# Patient Record
Sex: FEMALE | Race: WHITE
[De-identification: names, ages, dates, MRNs, and addresses within clinical notes are randomized per-mention and may not be internally consistent; named-entity substitution may affect disease eponyms.]

---

## 2017-04-18 ENCOUNTER — HOSPITAL ENCOUNTER (OUTPATIENT)
Dept: HOSPITAL 45 - C.LAB | Age: 62
Discharge: HOME | End: 2017-04-18
Attending: INTERNAL MEDICINE
Payer: COMMERCIAL

## 2017-04-18 DIAGNOSIS — E55.9: ICD-10-CM

## 2017-04-18 DIAGNOSIS — Z11.59: ICD-10-CM

## 2017-04-18 DIAGNOSIS — I10: Primary | ICD-10-CM

## 2017-04-18 LAB
APPEARANCE UR: CLEAR
BILIRUB UR-MCNC: (no result) MG/DL
COLOR UR: YELLOW
MANUAL MICROSCOPIC REQUIRED?: NO
NITRITE UR QL STRIP: (no result)
PH UR STRIP: 6 [PH] (ref 4.5–7.5)
REVIEW REQ?: NO
SP GR UR STRIP: 1.01 (ref 1–1.03)
URINE EPITHELIAL CELL AUTO: (no result) /LPF (ref 0–5)
UROBILINOGEN UR-MCNC: (no result) MG/DL
ZZUR CULT IF INDIC CLEAN CATCH: NO

## 2017-06-09 ENCOUNTER — HOSPITAL ENCOUNTER (OUTPATIENT)
Dept: HOSPITAL 45 - C.MAMM | Age: 62
Discharge: HOME | End: 2017-06-09
Attending: OBSTETRICS & GYNECOLOGY
Payer: COMMERCIAL

## 2017-06-09 DIAGNOSIS — Z12.31: Primary | ICD-10-CM

## 2017-06-09 NOTE — MAMMOGRAPHY REPORT
BILATERAL DIGITAL SCREENING MAMMOGRAM WITH CAD: 6/9/2017

CLINICAL HISTORY: Routine screening.  





TECHNIQUE:  Current study was also evaluated with a Computer Aided Detection (CAD) system.  Bilateral
 CC and MLO views were obtained.



COMPARISON: Comparison is made to exams dated:  9/8/2015 mammogram, 11/19/2013 mammogram, 2/16/2011 m
ammogram, 9/28/2005 mammogram, and 12/18/2003 mammogram - [facility].   



BREAST COMPOSITION:  There are scattered areas of fibroglandular density in both breasts.  



FINDINGS:  No suspicious masses, calcifications, or areas of architectural distortion are noted in ei
ther breast. There has been no significant interval change compared to prior exams.  Bilateral benign
-appearing calcifications are not significantly changed.  Small benign appearing mass in the left 12:
00 breast is stable compared to prior exams.





IMPRESSION:  ACR BI-RADS CATEGORY 2: BENIGN

There is no mammographic evidence of malignancy. A 1 year screening mammogram is recommended.  The pa
tient will receive written notification of the results.  





Approximately 10% of breast cancers are not detected with mammography. A negative mammographic report
 should not delay biopsy if a clinically suggestive mass is present.



Anel Triana M.D.          

ah/:6/9/2017 11:02:09  



Imaging Technologist: Odilon KING(R)(M), [facility]

letter sent: Normal 1/2  

BI-RADS Code: ACR BI-RADS Category 2: Benign

## 2017-06-15 ENCOUNTER — HOSPITAL ENCOUNTER (OUTPATIENT)
Dept: HOSPITAL 45 - C.MRIBC | Age: 62
Discharge: HOME | End: 2017-06-15
Attending: PHYSICIAN ASSISTANT
Payer: COMMERCIAL

## 2017-06-15 DIAGNOSIS — I77.1: ICD-10-CM

## 2017-06-15 DIAGNOSIS — I70.1: ICD-10-CM

## 2017-06-15 DIAGNOSIS — I65.23: Primary | ICD-10-CM

## 2017-06-15 NOTE — DIAGNOSTIC IMAGING REPORT
MRA ABDOMEN COMBO



CLINICAL HISTORY: I65.23,I70.1,I77.1 renal artery stenosis.



COMPARISON STUDY:  No previous studies for comparison.



FINDINGS: Imaging was performed before and after the administration of 8 cc of

intravenous Gadavist. Angiographic sequence was obtained. MIP imaging was

performed.



There is a 19 mm upper pole left renal cyst.



The right kidney is atrophic measuring 16 mm in length. The left kidney measures

106 mm in length. There is diminished right renal enhancement consistent with

renal artery stenosis.



There are advanced atheromatous changes present within the abdominal aorta.

There is a 75% stenosis involving the proximal right common iliac artery.



The right renal artery is not visualized, and is likely severely stenotic or

occluded.



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

with distal reconstitution.



There are atheromatous changes present within the main left renal artery, but

there is no evidence of hemodynamic significant stenosis. There is a suspected

stenosis of accessory lower pole left renal artery branch. The degree of

stenosis is difficult to quantitate.



IMPRESSION:  

1. Severe atheromatous changes within the abdominal aorta with multifocal areas

of irregular narrowing and plaque formation

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

3. Nonvisualization the right renal artery consistent with a severely stenotic

or occluded vessel. The right kidney is atrophic with diminished enhancement

4. Atheromatous changes within the main left renal artery but no evidence of

hemodynamic significant stenosis. Suspected stenosis of and accessory lower pole

left renal artery branch

5. 75% stenosis of the right common iliac artery 









Electronically signed by:  Chintan Luis M.D.

6/15/2017 9:54 AM



Dictated Date/Time:  6/15/2017 9:40 AM

## 2017-06-26 ENCOUNTER — HOSPITAL ENCOUNTER (OUTPATIENT)
Dept: HOSPITAL 45 - C.LABBFT | Age: 62
Discharge: HOME | End: 2017-06-26
Attending: NURSE PRACTITIONER
Payer: COMMERCIAL

## 2017-06-26 DIAGNOSIS — I70.1: ICD-10-CM

## 2017-06-26 DIAGNOSIS — I10: Primary | ICD-10-CM

## 2017-06-26 LAB
ANION GAP SERPL CALC-SCNC: 11 MMOL/L (ref 3–11)
APPEARANCE UR: CLEAR
BILIRUB UR-MCNC: (no result) MG/DL
BUN SERPL-MCNC: 13 MG/DL (ref 7–18)
BUN/CREAT SERPL: 10.8 (ref 10–20)
CALCIUM SERPL-MCNC: 9.1 MG/DL (ref 8.5–10.1)
CHLORIDE SERPL-SCNC: 110 MMOL/L (ref 98–107)
CO2 SERPL-SCNC: 18 MMOL/L (ref 21–32)
COLOR UR: YELLOW
CREAT SERPL-MCNC: 1.2 MG/DL (ref 0.6–1.2)
CREAT UR-MCNC: 20 MG/DL
GLUCOSE SERPL-MCNC: 102 MG/DL (ref 70–99)
MANUAL MICROSCOPIC REQUIRED?: NO
NITRITE UR QL STRIP: (no result)
PH UR STRIP: 7 [PH] (ref 4.5–7.5)
PHOSPHATE SERPL-MCNC: 3.8 MG/DL (ref 2.5–4.9)
POTASSIUM SERPL-SCNC: 3.7 MMOL/L (ref 3.5–5.1)
PROT UR STRIP-MCNC: 32.3 MG/DL (ref 0–11.9)
REVIEW REQ?: NO
SODIUM SERPL-SCNC: 139 MMOL/L (ref 136–145)
SP GR UR STRIP: 1.01 (ref 1–1.03)
URINE EPITHELIAL CELL AUTO: (no result) /LPF (ref 0–5)
URINE PROTIEN/CREAT RATIO: 1.6 (ref 0–0.2)
UROBILINOGEN UR-MCNC: (no result) MG/DL

## 2017-07-08 ENCOUNTER — HOSPITAL ENCOUNTER (OUTPATIENT)
Dept: HOSPITAL 45 - C.LABBC | Age: 62
Discharge: HOME | End: 2017-07-08
Attending: INTERNAL MEDICINE
Payer: COMMERCIAL

## 2017-07-08 DIAGNOSIS — I10: Primary | ICD-10-CM

## 2017-07-08 LAB
ANION GAP SERPL CALC-SCNC: 9 MMOL/L (ref 3–11)
APPEARANCE UR: CLEAR
BILIRUB UR-MCNC: (no result) MG/DL
BUN SERPL-MCNC: 11 MG/DL (ref 7–18)
BUN/CREAT SERPL: 7.6 (ref 10–20)
CALCIUM SERPL-MCNC: 9.3 MG/DL (ref 8.5–10.1)
CHLORIDE SERPL-SCNC: 90 MMOL/L (ref 98–107)
CO2 SERPL-SCNC: 24 MMOL/L (ref 21–32)
COLOR UR: YELLOW
CREAT SERPL-MCNC: 1.4 MG/DL (ref 0.6–1.2)
CREAT UR-MCNC: 19 MG/DL
GLUCOSE SERPL-MCNC: 99 MG/DL (ref 70–99)
MANUAL MICROSCOPIC REQUIRED?: NO
NITRITE UR QL STRIP: (no result)
PH UR STRIP: 7 [PH] (ref 4.5–7.5)
PHOSPHATE SERPL-MCNC: 3.1 MG/DL (ref 2.5–4.9)
POTASSIUM SERPL-SCNC: 3.5 MMOL/L (ref 3.5–5.1)
PROT UR STRIP-MCNC: 12.1 MG/DL (ref 0–11.9)
REVIEW REQ?: NO
SODIUM SERPL-SCNC: 123 MMOL/L (ref 136–145)
SP GR UR STRIP: 1.01 (ref 1–1.03)
TSH SERPL-ACNC: 1.62 UIU/ML (ref 0.3–4.5)
URINE BILL WITH OR WITHOUT MIC: (no result)
URINE PROTIEN/CREAT RATIO: 0.6 (ref 0–0.2)
UROBILINOGEN UR-MCNC: (no result) MG/DL

## 2017-07-10 ENCOUNTER — HOSPITAL ENCOUNTER (OUTPATIENT)
Dept: HOSPITAL 45 - C.LABBFT | Age: 62
Discharge: HOME | End: 2017-07-10
Attending: INTERNAL MEDICINE
Payer: COMMERCIAL

## 2017-07-10 ENCOUNTER — HOSPITAL ENCOUNTER (INPATIENT)
Dept: HOSPITAL 45 - C.EDB | Age: 62
LOS: 2 days | Discharge: HOME | DRG: 641 | End: 2017-07-12
Attending: INTERNAL MEDICINE | Admitting: HOSPITALIST
Payer: COMMERCIAL

## 2017-07-10 VITALS
BODY MASS INDEX: 31.56 KG/M2 | HEIGHT: 63 IN | WEIGHT: 178.13 LBS | BODY MASS INDEX: 31.56 KG/M2 | WEIGHT: 178.13 LBS | HEIGHT: 63 IN

## 2017-07-10 VITALS
OXYGEN SATURATION: 97 % | TEMPERATURE: 97.7 F | HEART RATE: 55 BPM | DIASTOLIC BLOOD PRESSURE: 63 MMHG | SYSTOLIC BLOOD PRESSURE: 118 MMHG

## 2017-07-10 VITALS
DIASTOLIC BLOOD PRESSURE: 68 MMHG | HEART RATE: 52 BPM | TEMPERATURE: 97.52 F | SYSTOLIC BLOOD PRESSURE: 195 MMHG | OXYGEN SATURATION: 97 %

## 2017-07-10 DIAGNOSIS — Z79.899: ICD-10-CM

## 2017-07-10 DIAGNOSIS — N18.3: ICD-10-CM

## 2017-07-10 DIAGNOSIS — E66.9: ICD-10-CM

## 2017-07-10 DIAGNOSIS — I15.0: ICD-10-CM

## 2017-07-10 DIAGNOSIS — Z96.0: ICD-10-CM

## 2017-07-10 DIAGNOSIS — I65.23: ICD-10-CM

## 2017-07-10 DIAGNOSIS — I10: Primary | ICD-10-CM

## 2017-07-10 DIAGNOSIS — R07.89: ICD-10-CM

## 2017-07-10 DIAGNOSIS — I70.1: ICD-10-CM

## 2017-07-10 DIAGNOSIS — Z79.02: ICD-10-CM

## 2017-07-10 DIAGNOSIS — F17.210: ICD-10-CM

## 2017-07-10 DIAGNOSIS — R01.1: ICD-10-CM

## 2017-07-10 DIAGNOSIS — G43.909: ICD-10-CM

## 2017-07-10 DIAGNOSIS — Z79.891: ICD-10-CM

## 2017-07-10 DIAGNOSIS — I73.9: ICD-10-CM

## 2017-07-10 DIAGNOSIS — R00.1: ICD-10-CM

## 2017-07-10 DIAGNOSIS — E87.1: ICD-10-CM

## 2017-07-10 DIAGNOSIS — E87.1: Primary | ICD-10-CM

## 2017-07-10 DIAGNOSIS — E78.5: ICD-10-CM

## 2017-07-10 DIAGNOSIS — N26.1: ICD-10-CM

## 2017-07-10 LAB
ALP SERPL-CCNC: 77 U/L (ref 45–117)
ALT SERPL-CCNC: 26 U/L (ref 12–78)
ANION GAP SERPL CALC-SCNC: 10 MMOL/L (ref 3–11)
ANION GAP SERPL CALC-SCNC: 11 MMOL/L (ref 3–11)
APPEARANCE UR: CLEAR
AST SERPL-CCNC: 14 U/L (ref 15–37)
BASOPHILS # BLD: 0.02 K/UL (ref 0–0.2)
BASOPHILS NFR BLD: 0.3 %
BILIRUB UR-MCNC: (no result) MG/DL
BUN SERPL-MCNC: 12 MG/DL (ref 7–18)
BUN SERPL-MCNC: 13 MG/DL (ref 7–18)
BUN/CREAT SERPL: 10.3 (ref 10–20)
BUN/CREAT SERPL: 9.3 (ref 10–20)
CALCIUM SERPL-MCNC: 9 MG/DL (ref 8.5–10.1)
CALCIUM SERPL-MCNC: 9.3 MG/DL (ref 8.5–10.1)
CHLORIDE SERPL-SCNC: 96 MMOL/L (ref 98–107)
CHLORIDE SERPL-SCNC: 97 MMOL/L (ref 98–107)
CKMB/CK RATIO: 1 (ref 0–3)
CO2 SERPL-SCNC: 19 MMOL/L (ref 21–32)
CO2 SERPL-SCNC: 24 MMOL/L (ref 21–32)
COLLECT DURATION TIME UR: 24 HOURS
COLOR UR: YELLOW
COMPLETE: YES
CREAT CL PREDICTED SERPL C-G-VRATE: 42.7 ML/MIN
CREAT CL PREDICTED SERPL C-G-VRATE: 49.8 ML/MIN
CREAT SERPL-MCNC: 1.2 MG/DL (ref 0.6–1.2)
CREAT SERPL-MCNC: 1.4 MG/DL (ref 0.6–1.2)
CREAT SERPL-MCNC: 1.4 MG/DL (ref 0.6–1.2)
CREAT UR-MCNC: 40 MG/DL
EOSINOPHIL NFR BLD AUTO: 267 K/UL (ref 130–400)
GLUCOSE SERPL-MCNC: 109 MG/DL (ref 70–99)
GLUCOSE SERPL-MCNC: 91 MG/DL (ref 70–99)
HCT VFR BLD CALC: 44.6 % (ref 37–47)
IG%: 0.5 %
IMM GRANULOCYTES NFR BLD AUTO: 30.6 %
INR PPP: 0.9 (ref 0.9–1.1)
LYMPHOCYTES # BLD: 1.89 K/UL (ref 1.2–3.4)
MANUAL MICROSCOPIC REQUIRED?: NO
MCH RBC QN AUTO: 30.9 PG (ref 25–34)
MCHC RBC AUTO-ENTMCNC: 35.2 G/DL (ref 32–36)
MCV RBC AUTO: 87.8 FL (ref 80–100)
MONOCYTES NFR BLD: 11.7 %
NEUTROPHILS # BLD AUTO: 1.6 %
NEUTROPHILS NFR BLD AUTO: 55.3 %
NITRITE UR QL STRIP: (no result)
PARTIAL THROMBOPLASTIN RATIO: 1.3
PATIENT HEIGHT: 160 CM
PH UR STRIP: 7 [PH] (ref 4.5–7.5)
PMV BLD AUTO: 10.1 FL (ref 7.4–10.4)
POTASSIUM SERPL-SCNC: 3.6 MMOL/L (ref 3.5–5.1)
POTASSIUM SERPL-SCNC: 3.8 MMOL/L (ref 3.5–5.1)
PROT UR STRIP-MCNC: 9.6 MG/DL (ref 0–11.9)
PROT UR STRIP.AUTO-MCNC: 247.2 MG/24 HR (ref 0–149.1)
PROTHROMBIN TIME: 9.9 SECONDS (ref 9–12)
RBC # BLD AUTO: 5.08 M/UL (ref 4.2–5.4)
REVIEW REQ?: NO
SODIUM SERPL-SCNC: 127 MMOL/L (ref 136–145)
SODIUM SERPL-SCNC: 130 MMOL/L (ref 136–145)
SP GR UR STRIP: 1.01 (ref 1–1.03)
URINE BILL WITH OR WITHOUT MIC: (no result)
UROBILINOGEN UR-MCNC: (no result) MG/DL
WBC # BLD AUTO: 6.18 K/UL (ref 4.8–10.8)

## 2017-07-10 RX ADMIN — VERAPAMIL HYDROCHLORIDE SCH MG: 120 TABLET, FILM COATED, EXTENDED RELEASE ORAL at 22:26

## 2017-07-10 RX ADMIN — ATORVASTATIN CALCIUM SCH MG: 40 TABLET, FILM COATED ORAL at 22:25

## 2017-07-10 RX ADMIN — TOPIRAMATE SCH MG: 25 TABLET, FILM COATED ORAL at 22:26

## 2017-07-10 RX ADMIN — ONDANSETRON PRN MG: 2 INJECTION INTRAMUSCULAR; INTRAVENOUS at 22:26

## 2017-07-10 NOTE — DIAGNOSTIC IMAGING REPORT
CHEST ONE VIEW PORTABLE



CLINICAL HISTORY: Hypertension    



COMPARISON STUDY:  12/4/2012



FINDINGS: The heart is borderline enlarged. There is mild hilar prominence,

likely secondary to prominent central pulmonary arteries. Pulmonary arterial

hypertension must be considered.[ There is no failure. There is no focal

pulmonary consolidation. No pleural effusions are visualized.



IMPRESSION: AP portable study. No evidence of failure. No evidence of focal

pulmonary consolidation. Prominent hilar/central pulmonary arteries.







Electronically signed by:  Chintan Luis M.D.

7/10/2017 4:46 PM



Dictated Date/Time:  7/10/2017 4:45 PM

## 2017-07-10 NOTE — EMERGENCY ROOM VISIT NOTE
History


Report prepared by Clare:  Dory Feldman


Under the Supervision of:  Dr. Ravinder Manuel D.O.


First contact with patient:  16:31


Chief Complaint:  HYPERTENSION


Stated Complaint:  SODIUM LEVEL LOW(123) HIGH BP





History of Present Illness


The patient is a 61 year old female who presents to the Emergency Room with 

complaints of constant illness beginning 2 days ago. The patient states that 

she has been feeling ill for the last week. She reports that she has no 

function of her right kidney and was taken off of Lisinopril but was recently 

put back on that and a water pill to control her hypertension. She notes that 2 

days ago she had labs done and her sodium was found to be low. After not 

feeling any better, she went to see her PCP this afternoon and they referred 

her here to the ED for concern about her sodium. The patient complains of 

increased thirst, nausea, constant headaches, achiness, irritability, and 

chills. She denies any confusion and leg swelling. She notes that she has 

slowed down in drinking fluids after her doctor told her that she was drinking 

too much with her low sodium.





   Source of History:  patient


   Onset:  2 days ago


   Position:  other (global)


   Quality:  other (illness)


   Timing:  constant


   Associated Symptoms:  + chills, + headache, + nausea


Note:


The patient complains of increased thirst, achiness, irritability. She denies 

any confusion and leg swelling.





Review of Systems


See HPI for pertinent positives & negatives. A total of 10 systems reviewed and 

were otherwise negative.





Past Medical & Surgical


Medical Problems:


(1) Hypernatremia


(2) Hypertension








Family History


No pertinent family history stated.





Social History


Smoking Status:  Current Some Day Smoker


Alcohol Use:  occasionally


Drug Use:  none


Marital Status:  


Occupation Status:  employed





Current/Historical Medications


Scheduled


Atorvastatin (Lipitor), 40 MG PO QPM


B-Complex Vitamins (Vitamin B Complex), 1 TAB PO DAILY


Clopidogrel Bisulfate (Plavix), 75 MG PO QAM


Ergocalciferol (Vitamin D 35140 Unit), 50,000 INTER.UNIT PO WK


Folic Acid (Folvite), 1 MG PO QPM


Hydralazine Hcl (Apresoline), 100 MG PO QID


Lisinopril (Zestril), 40 MG PO QAM


Nebivolol Hcl (Bystolic), 40 MG PO DAILY


Topiramate (Topamax), 50 MG PO BID


Verapamil Hcl (Verapamil Hcl Er), 240 MG PO QAM


Verapamil Hcl (Verapamil Hcl Er), 120 MG PEG HS





Scheduled PRN


Hydrocodone/Acetaminophen 7.5MG/325MG (Norco 7.5MG/325MG), 1 TAB PO BID PRN for 

Pain


Lorazepam (Ativan), 0.5 MG PO BID PRN for Anxiety


Tizanidine (Zanaflex), 2 MG PO TID PRN for Muscle Relaxer





Allergies


Coded Allergies:  


     Bupropion (Verified  Allergy, Unknown, UNKNOWN, 11/4/16)


     Carbamazepine (Unverified  Allergy, Unknown, UNKNOWN, 11/4/16)


     Codeine (Verified  Allergy, Unknown, "TIGHTENS MUSCLES UP" PER PT, 11/4/16)


     Penicillins (Verified  Allergy, Unknown, AMPICILLIN, 11/4/16)


     Sertraline (Unverified  Allergy, Unknown, UNKNOWN, 11/4/16)


     Sulfa Antibiotics (Unverified  Allergy, Unknown, HIVES, 11/7/16)


     Tetracycline (Verified  Allergy, Unknown, 11/4/16)


     Venlafaxine (Unverified  Allergy, Unknown, UNKNOWN, 11/4/16)


     Pork (Verified  Adverse Reaction, Intermediate, VOMIT, 11/4/16)


     Chocolate (Verified  Adverse Reaction, Mild, MIGRAINES, 11/4/16)


     Lithium (Verified  Adverse Reaction, Unknown, VIOLENT EMESIS, 11/4/16)





Physical Exam


Vital Signs











  Date Time  Temp Pulse Resp B/P (MAP) Pulse Ox O2 Delivery O2 Flow Rate FiO2


 


7/10/17 17:52  78 16 225/79 98 Room Air  


 


7/10/17 17:45  49      


 


7/10/17 14:26 36.6 50 18 161/59 94 Room Air  











Physical Exam


GENERAL:  Patient is awake, alert, and in no acute distress. Patient is resting 

comfortably and showing no signs of anxiety


EYES: The conjunctivae are clear.  The pupils are round and reactive. 


EARS, NOSE, MOUTH AND THROAT: The nose is without any evidence of any 

deformity. Mucous membranes are dry tongue is midline 


NECK: The neck is nontender and supple.


RESPIRATORY: Normal respiratory effort is noted there is no evidence of 

wheezing rhonchi or rales


CARDIOVASCULAR:  Regular rate and rhythm noted there no murmurs rubs or gallops 

normal S1 normal S2 


GASTROINTESTINAL: The abdomen is soft. Bowel sounds are present in all 

quadrants. Abdomen is nontender


MUSCULOSKELETAL/EXTREMITIES: There is no evidence of gross deformity full range 

of motion is noted in the hips and shoulders


SKIN: There is no obvious evidence of any rash. There are no petechiae, pallor 

or cyanosis noted. 


NEUROLOGIC:  Patient is awake alert and oriented x3 strength is symmetric 

patellar reflexes are 2+ bilaterally





Medical Decision & Procedures


ER Provider


Diagnostic Interpretation:


Radiology results as stated below per my review and radiologist interpretation:





CHEST ONE VIEW PORTABLE





FINDINGS: The heart is borderline enlarged. There is mild hilar prominence,


likely secondary to prominent central pulmonary arteries. Pulmonary arterial


hypertension must be considered.[ There is no failure. There is no focal


pulmonary consolidation. No pleural effusions are visualized.





IMPRESSION: AP portable study. No evidence of failure. No evidence of focal


pulmonary consolidation. Prominent hilar/central pulmonary arteries.





Electronically signed by:  Chintan Luis M.D.


7/10/2017 4:46 PM





Dictated Date/Time:  7/10/2017 4:45 PM





CT SCAN OF THE BRAIN WITHOUT IV CONTRAST





FINDINGS:





Brain parenchyma: There is minimal subcortical and periventricular


microangiopathic change. The brain parenchyma is otherwise normal in appearance.


There is no hemorrhage, mass effect, or evidence of acute territorial ischemia


by CT criteria. Gray-white matter is preserved. No extra-axial fluid collection


is seen.





Ventricles, sulci, cisterns: Normal in configuration.





Intracranial vasculature: There is atherosclerotic calcification of the


cavernous carotid and vertebral arteries.





Calvarium: Unremarkable.





Sinuses and mastoids: The visualized paranasal sinuses are clear. The mastoid


air cells are well pneumatized.





Orbits: The bony orbits are grossly intact.








IMPRESSION: There is no hemorrhage, mass effect, or evidence of acute


territorial ischemia by CT criteria.





Electronically signed by:  Jerson Glover M.D.


7/10/2017 5:29 PM





Dictated Date/Time:  7/10/2017 5:27 PM





Laboratory Results


7/10/17 17:08








Red Blood Count 5.08, Mean Corpuscular Volume 87.8, Mean Corpuscular Hemoglobin 

30.9, Mean Corpuscular Hemoglobin Concent 35.2, Mean Platelet Volume 10.1, 

Neutrophils (%) (Auto) 55.3, Lymphocytes (%) (Auto) 30.6, Monocytes (%) (Auto) 

11.7, Eosinophils (%) (Auto) 1.6, Basophils (%) (Auto) 0.3, Neutrophils # (Auto

) 3.42, Lymphocytes # (Auto) 1.89, Monocytes # (Auto) 0.72, Eosinophils # (Auto

) 0.10, Basophils # (Auto) 0.02





7/10/17 17:08

















Test


  7/10/17


00:00 7/10/17


17:08


 


Urine Color YELLOW  


 


Urine Appearance CLEAR (CLEAR)  


 


Urine pH 7.0 (4.5-7.5)  


 


Urine Specific Gravity


  1.007


(1.000-1.030) 


 


 


Urine Protein NEG (NEG)  


 


Urine Glucose (UA) NEG (NEG)  


 


Urine Ketones NEG (NEG)  


 


Urine Occult Blood NEG (NEG)  


 


Urine Nitrite NEG (NEG)  


 


Urine Bilirubin NEG (NEG)  


 


Urine Urobilinogen NEG (NEG)  


 


Urine Leukocyte Esterase NEG (NEG)  


 


Urine Osmolality


  84 mOms/kg


(500-800) 


 


 


Urine Random Sodium 13 mEq/L  


 


White Blood Count


  


  6.18 K/uL


(4.8-10.8)


 


Red Blood Count


  


  5.08 M/uL


(4.2-5.4)


 


Hemoglobin


  


  15.7 g/dL


(12.0-16.0)


 


Hematocrit  44.6 % (37-47) 


 


Mean Corpuscular Volume


  


  87.8 fL


()


 


Mean Corpuscular Hemoglobin


  


  30.9 pg


(25-34)


 


Mean Corpuscular Hemoglobin


Concent 


  35.2 g/dl


(32-36)


 


Platelet Count


  


  267 K/uL


(130-400)


 


Mean Platelet Volume


  


  10.1 fL


(7.4-10.4)


 


Neutrophils (%) (Auto)  55.3 % 


 


Lymphocytes (%) (Auto)  30.6 % 


 


Monocytes (%) (Auto)  11.7 % 


 


Eosinophils (%) (Auto)  1.6 % 


 


Basophils (%) (Auto)  0.3 % 


 


Neutrophils # (Auto)


  


  3.42 K/uL


(1.4-6.5)


 


Lymphocytes # (Auto)


  


  1.89 K/uL


(1.2-3.4)


 


Monocytes # (Auto)


  


  0.72 K/uL


(0.11-0.59)


 


Eosinophils # (Auto)


  


  0.10 K/uL


(0-0.5)


 


Basophils # (Auto)


  


  0.02 K/uL


(0-0.2)


 


RDW Standard Deviation


  


  42.8 fL


(36.4-46.3)


 


RDW Coefficient of Variation


  


  13.3 %


(11.5-14.5)


 


Immature Granulocyte % (Auto)  0.5 % 


 


Immature Granulocyte # (Auto)


  


  0.03 K/uL


(0.00-0.02)


 


Prothrombin Time


  


  9.9 SECONDS


(9.0-12.0)


 


Prothromb Time International


Ratio 


  0.9 (0.9-1.1) 


 


 


Activated Partial


Thromboplast Time 


  34.7 SECONDS


(21.0-31.0)


 


Partial Thromboplastin Ratio  1.3 


 


Anion Gap


  


  10.0 mmol/L


(3-11)


 


Est Creatinine Clear Calc


Drug Dose 


  49.8 ml/min 


 


 


Estimated GFR (


American) 


  56.5 


 


 


Estimated GFR (Non-


American 


  48.7 


 


 


BUN/Creatinine Ratio  10.3 (10-20) 


 


Osmolality


  


  263 mOsm/kg


(280-300)


 


Calcium Level


  


  9.0 mg/dl


(8.5-10.1)


 


Total Bilirubin


  


  0.3 mg/dl


(0.2-1)


 


Direct Bilirubin


  


  0.1 mg/dl


(0-0.2)


 


Aspartate Amino Transf


(AST/SGOT) 


  14 U/L (15-37) 


 


 


Alanine Aminotransferase


(ALT/SGPT) 


  26 U/L (12-78) 


 


 


Alkaline Phosphatase


  


  77 U/L


()


 


Total Creatine Kinase


  


  98 U/L


()


 


Creatine Kinase MB


  


  1.0 ng/ml


(0.5-3.6)


 


Creatine Kinase MB Ratio  1.0 (0-3.0) 


 


Total Protein


  


  6.9 gm/dl


(6.4-8.2)


 


Albumin


  


  3.9 gm/dl


(3.4-5.0)


 


Lipase


  


  566 U/L


()





Laboratory results per my review.





Medications Administered











 Medications


  (Trade)  Dose


 Ordered  Sig/Roxanne


 Route  Start Time


 Stop Time Status Last Admin


Dose Admin


 


 Sodium Chloride  1,000 ml @ 


 999 mls/hr  Q1H1M STAT


 IV  7/10/17 16:34


 7/10/17 17:34 DC 7/10/17 16:34


999 MLS/HR


 


 Ondansetron HCl


  (Zofran Inj)  4 mg  Q6H  PRN


 IV  7/10/17 19:15


 8/9/17 19:14  7/10/17 22:26


4 MG


 


 Acetaminophen/


 Hydrocodone Bitart


  (Norco 7.5/325


 Tab)  1 tab  BID  PRN


 PO  7/10/17 19:15


 7/24/17 19:14  7/10/17 22:26


1 TAB











ECG


Indication:  other (illness)


Rate (beats per minute):  47


Rhythm:  sinus bradycardia


Findings:  no ectopy, other (no ST segment abnormalities)


Comparison ECG Date:  9/2/2012


Change:  no significant change





ED Course


1631: The patient was evaluated in room C3. A complete history and physical 

examination were performed. 





1634: NSS 1,000 ml @ 999 mls/hr IV.





1827: I spoke to Dr. Navarro about the patient's case. 





1832: I discussed the patient's case with Dr. Dye. The patient will be 

evaluated for further management. 





1847: Upon reevaluation, the patient is doing well. I discussed results and 

treatment plan with the patient. She verbalizes agreement and understanding. I 

spoke with Dr. Dye of the [facility]. The patient will be evaluated for further 

management and care.





Medical Decision


Differential diagnosis:


Etiologies such as metabolic, infection, hypo/hyperglycemia, electrolyte 

abnormalities, cardiac sources, intracerebral event, toxicologic, neurologic, 

as well as others were entertained. 





Medication Reconciliation: I attest that I have personally reviewed the patient'

s current medications list.


Blood pressure screening: Patient was found to have an elevated blood pressure 

and was referred to their primary doctor for recheck and further treatment. 





The patient is a 61-year-old female who presented to emergency department for 

evaluation after abnormal laboratory studies were found. The patient is a 

history of probably see initially which led to hyponatremia. She tried to 

manage this with medications as well as fluid restriction but on subsequent 

reevaluation was found have continued hyponatremia as well as hypertension. The 

patient was sent to the emergency department for further evaluation. The 

patient was treated with IV fluids in the emergency department. I discussed her 

case with her primary covering nephrologist. I also discussed her case with the 

[facility] hospitalist. They've agreed to evaluate the patient in 

emergency department for further management and disposition. I discussed the 

patient's laboratory and radiographic studies with her.





Consults


Time Called:  1825


Consulting Physician:  Dr. Navarro


Returned Call:  1827


I spoke to Dr. Navarro about the patient's case.


Additional Consults:  


   Time Called:  1830


   Consulted Physician:  Dr. Dye


   Returned Call:  1832


Additional Comments:


I discussed the patient's case with Dr. Dye. The patient will be evaluated 

for further management.





Impression





 Primary Impression:  


 Hyponatremia


 Additional Impressions:  


 Hypertension


 Nausea


 Headache





Scribe Attestation


The scribe's documentation has been prepared under my direction and personally 

reviewed by me in its entirety. I confirm that the note above accurately 

reflects all work, treatment, procedures, and medical decision making performed 

by me.





Departure Information


Dispostion


Being Evaluated By Hospitalist





Referrals


Austin Heath M.D. (PCP)





Patient Instructions


My [facility]





Problem Qualifiers








 Additional Impressions:  


 Hypertension


 Hypertension type:  unspecified  Qualified Codes:  I10 - Essential (primary) 

hypertension


 Headache


 Headache type:  unspecified  Headache chronicity pattern:  acute headache  

Intractability:  not intractable  Qualified Codes:  R51 - Headache

## 2017-07-10 NOTE — DIAGNOSTIC IMAGING REPORT
CT SCAN OF THE BRAIN WITHOUT IV CONTRAST



CLINICAL HISTORY: Headache.



COMPARISON STUDY:  CT the brain dated 7/23/2012.



TECHNIQUE: Unenhanced axial CT scan of the brain is performed from the vertex to

the skull base. Automated dose control exposure was utilized.



CT DOSE: 537.48 mGy.cm



FINDINGS:



Brain parenchyma: There is minimal subcortical and periventricular

microangiopathic change. The brain parenchyma is otherwise normal in appearance.

There is no hemorrhage, mass effect, or evidence of acute territorial ischemia

by CT criteria. Gray-white matter is preserved. No extra-axial fluid collection

is seen.



Ventricles, sulci, cisterns: Normal in configuration.



Intracranial vasculature: There is atherosclerotic calcification of the

cavernous carotid and vertebral arteries.



Calvarium: Unremarkable.



Sinuses and mastoids: The visualized paranasal sinuses are clear. The mastoid

air cells are well pneumatized.



Orbits: The bony orbits are grossly intact.





IMPRESSION: There is no hemorrhage, mass effect, or evidence of acute

territorial ischemia by CT criteria.







Electronically signed by:  Jerson Glover M.D.

7/10/2017 5:29 PM



Dictated Date/Time:  7/10/2017 5:27 PM

## 2017-07-10 NOTE — HISTORY AND PHYSICAL
History & Physical


Date & Time of Service:


Jul 10, 2017 at 19:20


Chief Complaint:


Sodium Level Low(123) High Bp


Primary Care Physician:


Austin Heath M.D.


History of Present Illness


Source:  patient


Ms. Palma is a 60 y/o female with PMHx of Secondary HTN due to R Renal Artery 

Stenosis S/P Stent, R Atrophic Kidney, CKD Stage III, Carotid Stenosis, 

Migraine HAs, and Bipolar I Disorder who presents to the ED for hyponatremia x 

2 days. Patient has had a long history of resistant HTN and follows with Dr. Sutton. She had a renal artery stent place a couple years ago but reports that 

in the past 6 months she was told her R kidney no longer functions. It is not 

uncommon for her BP to be 180-200 systolically but reports a normal diastolic 

normally. She is on a multiple-drug regimen including Hydralazine 100 mg QID, 

Lisinopril 40 mg daily (restarted on June 29), Bystolic 40 mg daily, and 

Verapamil 240 mg AM and 120 mg HS (for migraines as well). She was then started 

on Chlorathalidone on June 29. She reports it helped slightly with her BP but 

she felt sick on this medication and presented to her PCP on July 8. She 

reported dry mouth and polydipsia and states she was drinking a lot of water. 

Outpatient Na revealed hyponatremia at 123 and was advised to decrease her 

fluid intake with repeat Na today of 127 with repeat in ED of 130. Current 

symptoms include generalized weakness, polydipsia, nausea without vomiting, 

constant headache, generalized pain "muscle tightness", increased irritability 

and chills (started in ED). She reports, even with improving Na levels, she had 

no improvement in symptoms but symptoms have not worsened. 





In the ED, patient Na noted at 130. Creatinine 1.2. Hypertensive ranging from 

161-225 systolically with normal diastolic. She was hydrated with NSS x 1 L. 

She will be admitted to telemetry for hyponatremia and HTN.





Past Medical/Surgical History


Medical Problems:


(1) Hypertension


Status: Chronic  











Family History





Diabetes mellitus





Social History


Smoking Status:  Current Some Day Smoker


Drug Use:  none


Marital Status:  


Occupational Status:  employed





Immunizations


History of Influenza Vaccine:  No


History of Tetanus Vaccine?:  UTD


History of Pneumococcal:  No


History of Hepatitis B Vaccine:  No





Multi-Drug Resistant Organisms


History of MDRO:  No





Allergies


Coded Allergies:  


     Bupropion (Verified  Allergy, Unknown, UNKNOWN, 11/4/16)


     Carbamazepine (Unverified  Allergy, Unknown, UNKNOWN, 11/4/16)


     Codeine (Verified  Allergy, Unknown, "TIGHTENS MUSCLES UP" PER PT, 11/4/16)


     Penicillins (Verified  Allergy, Unknown, AMPICILLIN, 11/4/16)


     Sertraline (Unverified  Allergy, Unknown, UNKNOWN, 11/4/16)


     Sulfa Antibiotics (Unverified  Allergy, Unknown, HIVES, 11/7/16)


     Tetracycline (Verified  Allergy, Unknown, 11/4/16)


     Venlafaxine (Unverified  Allergy, Unknown, UNKNOWN, 11/4/16)


     Pork (Verified  Adverse Reaction, Intermediate, VOMIT, 11/4/16)


     Chocolate (Verified  Adverse Reaction, Mild, MIGRAINES, 11/4/16)


     Lithium (Verified  Adverse Reaction, Unknown, VIOLENT EMESIS, 11/4/16)





Home Medications


Scheduled


Atorvastatin (Lipitor), 40 MG PO QPM


B-Complex Vitamins (Vitamin B Complex), 1 TAB PO DAILY


Clopidogrel Bisulfate (Plavix), 75 MG PO QAM


Ergocalciferol (Vitamin D 27996 Unit), 50,000 INTER.UNIT PO WK


Folic Acid (Folvite), 1 MG PO QPM


Hydralazine Hcl (Apresoline), 100 MG PO QID


Lisinopril (Zestril), 40 MG PO QAM


Nebivolol Hcl (Bystolic), 40 MG PO DAILY


Topiramate (Topamax), 50 MG PO BID


Verapamil Hcl (Verapamil Hcl Er), 240 MG PO QAM


Verapamil Hcl (Verapamil Hcl Er), 120 MG PEG HS





Scheduled PRN


Hydrocodone/Acetaminophen 7.5MG/325MG (Norco 7.5MG/325MG), 1 TAB PO BID PRN for 

Pain


Lorazepam (Ativan), 0.5 MG PO BID PRN for Anxiety


Tizanidine (Zanaflex), 2 MG PO TID PRN for Muscle Relaxer





Review of Systems


Constitutional:  + chills, + weakness (generalized), + fatigue, No fever


Eyes:  No worsening of vision, No diplopia


ENT:  No nasal symptoms, No sore throat, No trouble swallowing


Respiratory:  No shortness of breath


Cardiovascular:  No chest pain, No palpitations


Abdomen:  + nausea, + diarrhea (chronic - intermittent - not worsened), No pain

, No vomiting, No constipation, No GI bleeding


Musculoskeletal:  No swelling, No calf pain


Genitourinary - Female:  No dysuria


Psychiatric:  + problem reported (increased irritability)


Endocrine:  + fatigue, + excessive thirst


Hematologic / Lymphatic:  No abnormal bleeding/bruising, No clotting problems


Integumentary:  No rash





Physical Exam


Vital Signs











  Date Time  Temp Pulse Resp B/P (MAP) Pulse Ox O2 Delivery O2 Flow Rate FiO2


 


7/10/17 17:52  78 16 225/79 98 Room Air  


 


7/10/17 17:45  49      


 


7/10/17 14:26 36.6 50 18 161/59 94 Room Air  








General Appearance:  WD/WN, no apparent distress, + obese


Head:  normocephalic, atraumatic


Eyes:  sclerae normal


ENT:  hearing grossly normal


Neck:  supple, no JVD, trachea midline


Respiratory/Chest:  lungs clear, normal breath sounds, no respiratory distress, 

no accessory muscle use


Cardiovascular:  regular rate, rhythm, no gallop, + systolic murmur


Abdomen/GI:  normal bowel sounds, non tender, soft


Extremities/Musculoskelatal:  no calf tenderness, no pedal edema


Neurologic/Psych:  alert, oriented x 3


Skin:  normal color, warm/dry





Diagnostics


Laboratory Results





Results Past 24 Hours








Test


  7/10/17


17:08 Range/Units


 


 


White Blood Count 6.18 4.8-10.8  K/uL


 


Red Blood Count 5.08 4.2-5.4  M/uL


 


Hemoglobin 15.7 12.0-16.0  g/dL


 


Hematocrit 44.6 37-47  %


 


Mean Corpuscular Volume 87.8   fL


 


Mean Corpuscular Hemoglobin 30.9 25-34  pg


 


Mean Corpuscular Hemoglobin


Concent 35.2


  32-36  g/dl


 


 


Platelet Count 267 130-400  K/uL


 


Mean Platelet Volume 10.1 7.4-10.4  fL


 


Neutrophils (%) (Auto) 55.3  %


 


Lymphocytes (%) (Auto) 30.6  %


 


Monocytes (%) (Auto) 11.7  %


 


Eosinophils (%) (Auto) 1.6  %


 


Basophils (%) (Auto) 0.3  %


 


Neutrophils # (Auto) 3.42 1.4-6.5  K/uL


 


Lymphocytes # (Auto) 1.89 1.2-3.4  K/uL


 


Monocytes # (Auto) 0.72 0.11-0.59  K/uL


 


Eosinophils # (Auto) 0.10 0-0.5  K/uL


 


Basophils # (Auto) 0.02 0-0.2  K/uL


 


RDW Standard Deviation 42.8 36.4-46.3  fL


 


RDW Coefficient of Variation 13.3 11.5-14.5  %


 


Immature Granulocyte % (Auto) 0.5  %


 


Immature Granulocyte # (Auto) 0.03 0.00-0.02  K/uL


 


Prothrombin Time


  9.9


  9.0-12.0


SECONDS


 


Prothromb Time International


Ratio 0.9


  0.9-1.1  


 


 


Activated Partial


Thromboplast Time 34.7


  21.0-31.0


SECONDS


 


Partial Thromboplastin Ratio 1.3  


 


Sodium Level 130 136-145  mmol/L


 


Potassium Level 3.6 3.5-5.1  mmol/L


 


Chloride Level 96   mmol/L


 


Carbon Dioxide Level 24 21-32  mmol/L


 


Anion Gap 10.0 3-11  mmol/L


 


Blood Urea Nitrogen 12 7-18  mg/dl


 


Creatinine


  1.20


  0.60-1.20


mg/dl


 


Est Creatinine Clear Calc


Drug Dose 49.8


   ml/min


 


 


Estimated GFR (


American) 56.5


   


 


 


Estimated GFR (Non-


American 48.7


   


 


 


BUN/Creatinine Ratio 10.3 10-20  


 


Random Glucose 91 70-99  mg/dl


 


Osmolality


  263


  280-300


mOsm/kg


 


Calcium Level 9.0 8.5-10.1  mg/dl


 


Total Bilirubin 0.3 0.2-1  mg/dl


 


Direct Bilirubin 0.1 0-0.2  mg/dl


 


Aspartate Amino Transf


(AST/SGOT) 14


  15-37  U/L


 


 


Alanine Aminotransferase


(ALT/SGPT) 26


  12-78  U/L


 


 


Alkaline Phosphatase 77   U/L


 


Total Creatine Kinase 98   U/L


 


Creatine Kinase MB 1.0 0.5-3.6  ng/ml


 


Creatine Kinase MB Ratio 1.0 0-3.0  


 


Troponin I < 0.015 0-0.045  ng/ml


 


Total Protein 6.9 6.4-8.2  gm/dl


 


Albumin 3.9 3.4-5.0  gm/dl


 


Lipase 566   U/L











Diagnostic Radiology


CT SCAN OF THE BRAIN WITHOUT IV CONTRAST





FINDINGS:


Brain parenchyma: There is minimal subcortical and periventricular


microangiopathic change. The brain parenchyma is otherwise normal in appearance.


There is no hemorrhage, mass effect, or evidence of acute territorial ischemia


by CT criteria. Gray-white matter is preserved. No extra-axial fluid collection


is seen.





Ventricles, sulci, cisterns: Normal in configuration.





Intracranial vasculature: There is atherosclerotic calcification of the


cavernous carotid and vertebral arteries.





Calvarium: Unremarkable.





Sinuses and mastoids: The visualized paranasal sinuses are clear. The mastoid


air cells are well pneumatized.





Orbits: The bony orbits are grossly intact.





IMPRESSION: There is no hemorrhage, mass effect, or evidence of acute


territorial ischemia by CT criteria.





CHEST ONE VIEW PORTABLE





FINDINGS: The heart is borderline enlarged. There is mild hilar prominence,


likely secondary to prominent central pulmonary arteries. Pulmonary arterial


hypertension must be considered.[ There is no failure. There is no focal


pulmonary consolidation. No pleural effusions are visualized.





IMPRESSION: AP portable study. No evidence of failure. No evidence of focal


pulmonary consolidation. Prominent hilar/central pulmonary arteries.





EKG


Sinus bradycardia


Otherwise normal ECG


When compared with ECG of 04-DEC-2012 17:49,


No significant change was found


Confirmed by LEONID WHALEY MD ([id number]) on 7/10/2017 5:44:34 PM





Impression


Assessment and Plan


Ms. Palma is a 60 y/o female with PMHx of Secondary HTN due to R Renal Artery 

Stenosis S/P Stent, R Atrophic Kidney, CKD Stage III, Carotid Stenosis, 

Migraine HAs, and Bipolar I Disorder who presents to the ED for hyponatremia x 

2 days.





Hyponatremia: Suspect Dilutional


- Fluid status hard to appreciate due to body habitus but appears euvolemic. No 

JVD and no pulmonary congestion on CXR. Does not appear dehydrated. Was 

recently started on Chlorthalidone on June 29th only taking for a couple days 

and D/Cing. She reports polydipsia and taking in large amounts of fluids. She 

was advised to decrease fluid intake and Na improved from 123 (July 8) to 127 (

July 10) with repeat in ED of 130.


- Was given NSS 1 L in ED but will implement fluid restriction on 1200 mL


- Serum Osm low - awaiting UA for random urine Na and urine osm for further 

evaluation


- Consult nephrology - appreciate recommendations with Na and resistent 

secondary HTN





Secondary HTN 2/2 R Renal Artery Stenosis S/P Stent: UNCONTROLLED


- Hydralazine 100 mg QID, Lisinopril 40 mg daily, and Bystolic 40 mg daily





Elevated Lipase:


- Likely reactive and will repeat in AM





HLD and Carotid Stenosis (R>L):


- Atorvastatin 40 mg daily


- Plavix 75 mg daily





CKD Stage III: Baseline Cr 1.0-1.2


- Currently baseline - had mild increase to 1.4 on outpatient labs but was 

restarted on Lisinopril at that time





Bipolar Type I and Migraine HA:


- Ativan 0.5 mg BID PRN, Zanaflex 2 mg TID PRN, Topiramate 50 mg BID, and 

Verapamil 240 mg AM and 120 mg PM





DVT Prophylaxis: SCDs





Code Status: FULL RESUSCITATION





Disposition: No home needs anticipated





Level of Care


Telemetry





Resuscitation Status


FULL RESUSCITATION





VTE Prophylaxis


VTE Risk Assessment Done? Y/N:  Yes


Risk Level:  Low


Given or contraindicated:  T.E.DPablo Stockings, SCD's





Reviewed:  Pt Seen/Exam by Me


History


Pt states she still has a headache and is still a bit nauseated.  She denies 

any hx of abd pain at any time.  She feels the nausea is related to her h/a.  

She states she has hx of migraines and thought this was a migraine, however 

when it persisted she decided it might be otherwise.  All of her sx started 

after she started taking chlorthalidone recently.  She states she has chest pain

, but she feels it is related to a general feeling of whole body muscle 

tightness.  She has muscular pain from head to toe essentially.  She states she 

has been incredibly thirsty recently and didn't realize a person could drink 

too much water.  This has improved with the improvement in her sodium levels.  

No SOB or emesis.





Agree with HPI/ROS as noted.





Pt states she smokes on occasional for "calming my nerves".  She does not smoke 

daily.


General Appearance:  WD/WN, no apparent distress


Respiratory:  normal breath sounds, no respiratory distress


Cardiovascular:  regular rate, rhythm, no edema


Gastrointestinal:  non tender, soft


Extremities:  non-tender, no pedal edema


Neurologic/Psychiatric:  alert, normal mood/affect, oriented x 3


Skin Characteristics:  normal color, warm/dry


Assessment/Plan


Agree with plan as outlined above





HypoNa: seems related to chlorthalidone use and was improving with fluid 

restriction and d/c of medication, however pt still sx and with elevated BP


   Fluid restriction, monitor


   Has been working with Dr. Sutton for this, c/s placed





Chest pain: c/w MSK pains, will keep on home pain meds for now


   Trop neg, serials pending


   Given elevated BP and chest pain, will place on tele monitor for now





Headache: likely related to elevated BP, monitor





Elevated lipase: pt with hx of nausea, but no abd pain per report or exam


   Can monitor but will hold on tx for pancreatitis for now





Declines need for nicotine patch

## 2017-07-11 VITALS
SYSTOLIC BLOOD PRESSURE: 174 MMHG | DIASTOLIC BLOOD PRESSURE: 65 MMHG | OXYGEN SATURATION: 98 % | HEART RATE: 51 BPM | TEMPERATURE: 98.06 F

## 2017-07-11 VITALS
SYSTOLIC BLOOD PRESSURE: 139 MMHG | HEART RATE: 46 BPM | TEMPERATURE: 97.7 F | DIASTOLIC BLOOD PRESSURE: 64 MMHG | OXYGEN SATURATION: 97 %

## 2017-07-11 VITALS
SYSTOLIC BLOOD PRESSURE: 161 MMHG | HEART RATE: 51 BPM | TEMPERATURE: 97.7 F | DIASTOLIC BLOOD PRESSURE: 68 MMHG | OXYGEN SATURATION: 96 %

## 2017-07-11 VITALS
OXYGEN SATURATION: 96 % | SYSTOLIC BLOOD PRESSURE: 146 MMHG | HEART RATE: 49 BPM | TEMPERATURE: 98.6 F | DIASTOLIC BLOOD PRESSURE: 55 MMHG

## 2017-07-11 VITALS
OXYGEN SATURATION: 97 % | SYSTOLIC BLOOD PRESSURE: 178 MMHG | TEMPERATURE: 98.24 F | DIASTOLIC BLOOD PRESSURE: 72 MMHG | HEART RATE: 52 BPM

## 2017-07-11 VITALS
DIASTOLIC BLOOD PRESSURE: 51 MMHG | SYSTOLIC BLOOD PRESSURE: 156 MMHG | TEMPERATURE: 97.52 F | HEART RATE: 49 BPM | OXYGEN SATURATION: 96 %

## 2017-07-11 VITALS
SYSTOLIC BLOOD PRESSURE: 141 MMHG | HEART RATE: 79 BPM | DIASTOLIC BLOOD PRESSURE: 79 MMHG | OXYGEN SATURATION: 94 % | TEMPERATURE: 97.88 F

## 2017-07-11 LAB
ANION GAP SERPL CALC-SCNC: 7 MMOL/L (ref 3–11)
BUN SERPL-MCNC: 14 MG/DL (ref 7–18)
BUN/CREAT SERPL: 10.9 (ref 10–20)
CALCIUM SERPL-MCNC: 8.4 MG/DL (ref 8.5–10.1)
CHLORIDE SERPL-SCNC: 106 MMOL/L (ref 98–107)
CO2 SERPL-SCNC: 22 MMOL/L (ref 21–32)
CREAT CL PREDICTED SERPL C-G-VRATE: 45.3 ML/MIN
CREAT SERPL-MCNC: 1.3 MG/DL (ref 0.6–1.2)
EST. AVERAGE GLUCOSE BLD GHB EST-MCNC: 100 MG/DL
GLUCOSE SERPL-MCNC: 119 MG/DL (ref 70–99)
POTASSIUM SERPL-SCNC: 3.6 MMOL/L (ref 3.5–5.1)
SODIUM SERPL-SCNC: 135 MMOL/L (ref 136–145)

## 2017-07-11 RX ADMIN — Medication SCH TAB: at 09:01

## 2017-07-11 RX ADMIN — TOPIRAMATE SCH MG: 25 TABLET, FILM COATED ORAL at 09:00

## 2017-07-11 RX ADMIN — ATORVASTATIN CALCIUM SCH MG: 40 TABLET, FILM COATED ORAL at 21:16

## 2017-07-11 RX ADMIN — ACETAMINOPHEN PRN MG: 325 TABLET ORAL at 21:15

## 2017-07-11 RX ADMIN — LISINOPRIL SCH MG: 40 TABLET ORAL at 09:01

## 2017-07-11 RX ADMIN — ACETAMINOPHEN PRN MG: 325 TABLET ORAL at 07:47

## 2017-07-11 RX ADMIN — TOPIRAMATE SCH MG: 25 TABLET, FILM COATED ORAL at 21:16

## 2017-07-11 RX ADMIN — VERAPAMIL HYDROCHLORIDE SCH MG: 120 TABLET, FILM COATED, EXTENDED RELEASE ORAL at 21:17

## 2017-07-11 RX ADMIN — CLOPIDOGREL BISULFATE SCH MG: 75 TABLET, FILM COATED ORAL at 08:59

## 2017-07-11 RX ADMIN — NEBIVOLOL HYDROCHLORIDE SCH MG: 5 TABLET ORAL at 09:03

## 2017-07-11 RX ADMIN — ONDANSETRON PRN MG: 2 INJECTION INTRAMUSCULAR; INTRAVENOUS at 05:38

## 2017-07-11 NOTE — MEDICAL STUDENT: [facility]
Med Student History & Physical


Date & Time of Service:


Jul 11, 2017 at 13:24


Chief Complaint:


Hyponatremia


Primary Care Physician:


Austin Heath M.D.


History of Present Illness


Source:  patient, hospital records


Patient with PMHx of HTN secondary to R renal artery stenosis s/p stent, R 

atrophic kidney (x 6months), CKD stage III, carotid stenosis, systolic heart 

murmur, migraines, and bipolar disorder, presented to the ED for felling "ill" 

for 2 days prior to admission.  Patient reports she was started on 

Chlorathalidone for her HTN on 6/29/2017, and since then she has felt "sick."  

She specifically notes recent history of symptoms including excessive thirst, 

nausea, headaches in greater frequency/duration/severity than baseline, muscle 

achiness, generally weakness, irritability, and chills.  She was seen at her 

walk-in clinic on 7/8/2017 for similar symptoms, had Na+ level of 123, was told 

to decrease her water intake and drink gatorade instead.  She reports that 

after switching to gatorade her level of thirst decreased, but was told to 

follow-up with her PCP on 7/10/2017 if she did not improve; her PCP referred 

her to the ED because her blood pressure was still "high," and her Na+ was 123.

  In the ED, a repeat Na+ showed a level of 130.





Patient reports that her R renal artery stenosis is followed every 6 months 

with renal ultrasounds.  She states she had a stent placed in her right kidney 

"4-5" years ago, but at her most recent renal ultrasound (approximately 6 

months ago) showed atrophy of the right kidney without any functional capacity.

  She notes she had a MRA to confirm this diagnosis, and reports no problems 

with her left kidney at this point. 





Currently, the patient states her headache is better from tylenol treatment and 

her nausea has improved with medication treatment.  She notes she has migraines 

at baseline, but the high BP she has experienced recently has worsened her 

headaches.  She states she thinks starting the Chlorathalidone was the root 

cause of her symptoms, since she has had many uncommon medication reactions in 

the past.  She reports that when her muscles feel "constricted," she thinks it 

is from the increased headache pain causing muscles to constrict, including 

muscles around ribcage, causing shortness of breath.  She notes an associated 

symptom of decreased appetite secondary to nausea, without associated weight 

loss for the last week.  She denies blurry vision, hearing changes, dry eyes, 

any rash, any bruising, abdominal pain, bowel or bladder changes, shortness of 

breath, vomiting, and leg swelling.





Past Medical/Surgical History


Medical Problems:


(1) Headache


Status: Acute  





(2) Hypertension


Status: Chronic  





(3) Hyponatremia


Status: Acute  





(4) Nausea


Status: Acute  











Social History


Smoking Status:  Light Tobacco Smoker ("to calm my nerves")


Drug Use:  none


Marital Status:  


Occupational Status:  employed





Immunizations


History of Influenza Vaccine:  No


History of Tetanus Vaccine?:  UTD


History of Pneumococcal:  No


History of Hepatitis B Vaccine:  No





Allergies


Coded Allergies:  


     Bupropion (Verified  Allergy, Unknown, UNKNOWN, 11/4/16)


     Carbamazepine (Unverified  Allergy, Unknown, UNKNOWN, 11/4/16)


     Codeine (Verified  Allergy, Unknown, "TIGHTENS MUSCLES UP" PER PT, 11/4/16)


     Penicillins (Verified  Allergy, Unknown, AMPICILLIN, 11/4/16)


     Sertraline (Unverified  Allergy, Unknown, UNKNOWN, 11/4/16)


     Sulfa Antibiotics (Unverified  Allergy, Unknown, HIVES, 11/7/16)


     Tetracycline (Verified  Allergy, Unknown, 11/4/16)


     Venlafaxine (Unverified  Allergy, Unknown, UNKNOWN, 11/4/16)


     Pork (Verified  Adverse Reaction, Intermediate, VOMIT, 11/4/16)


     Chocolate (Verified  Adverse Reaction, Mild, MIGRAINES, 11/4/16)


     Lithium (Verified  Adverse Reaction, Unknown, VIOLENT EMESIS, 11/4/16)





Medications


Atorvastatin (Lipitor), 40 MG PO QPM


B-Complex Vitamins (Vitamin B Complex), 1 TAB PO DAILY


Clopidogrel Bisulfate (Plavix), 75 MG PO QAM


Ergocalciferol (Vitamin D 01466 Unit), 50,000 INTER.UNIT PO WK


Folic Acid (Folvite), 1 MG PO QPM


Hydralazine Hcl (Apresoline), 100 MG PO QID


Hydrocodone/Acetaminophen 7.5MG/325MG (Norco 7.5MG/325MG), 1 TAB PO BID PRN for 

Pain


Lisinopril (Zestril), 40 MG PO QAM


Lorazepam (Ativan), 0.5 MG PO BID PRN for Anxiety


Nebivolol Hcl (Bystolic), 40 MG PO DAILY


Tizanidine (Zanaflex), 2 MG PO TID PRN for Muscle Relaxer


Topiramate (Topamax), 50 MG PO BID


Verapamil Hcl (Verapamil Hcl Er), 240 MG PO QAM


Verapamil Hcl (Verapamil Hcl Er), 120 MG PEG HS





Review of Systems


Constitutional:  No chills (resolved), No weakness (resolved)


ENT:  No trouble swallowing


Respiratory:  No shortness of breath, No dyspnea at rest


Cardiovascular:  No chest pain, No edema


Abdomen:  + nausea (some relief with zofran), No vomiting


Musculoskeletal:  No swelling, No calf pain


Genitourinary - Female:  No dysuria, No urinary urgency


Neurologic:  No memory loss (no confusion), No balance problems (not above 

baseline, uses walker/cane at baseline)


Endocrine:  No excessive thirst (resolved by drinking gatorade), No excessive 

urination


Integumentary:  No rash


Allergic / Immunologic:  + problem reported (many drug allergies; patient now 

thinks has allergy to Chlorathalidone)





Physical Exam


Vital Signs (24 Hours)











  Date Time  Temp Pulse Resp B/P (MAP) Pulse Ox O2 Delivery O2 Flow Rate FiO2


 


7/11/17 12:00      Room Air  


 


7/11/17 11:09 37.0 49 20 146/55 (85) 96   


 


7/11/17 08:00      Room Air  


 


7/11/17 07:40 36.8 52 20 178/72 (107) 97 Room Air  


 


7/11/17 04:05 36.5 51 17 161/68 (99) 96 Room Air  


 


7/11/17 04:00      Room Air  


 


7/11/17 00:00      Room Air  


 


7/10/17 23:31 36.5 55 17 118/63 (81) 97 Room Air  


 


7/10/17 22:06 36.4 52 20 195/68 97 Room Air  


 


7/10/17 20:29  49 18 177/55 96   


 


7/10/17 17:52  78 16 225/79 98 Room Air  


 


7/10/17 17:45  49      


 


7/10/17 14:26 36.6 50 18 161/59 94 Room Air  








General Appearance:  WD/WN, no apparent distress, + obese


Head:  atraumatic


Eyes:  normal inspection, PERRL


Neck:  supple


Respiratory/Chest:  chest non-tender, lungs clear, normal breath sounds, no 

respiratory distress, no accessory muscle use


Cardiovascular:  regular rate, rhythm, no edema, + systolic murmur (history of 

similar)


Abdomen/GI:  normal bowel sounds, non tender, soft


Back:  normal inspection, no CVA tenderness


Extremities/Musculoskelatal:  no calf tenderness, no pedal edema, normal range 

of motion (uses walker/cane at baseline)


Neurologic/Psych:  alert, normal mood/affect, oriented x 3


Skin:  normal color, warm/dry, no rash





Diagnostics


Laboratory Results





Results Past 24 Hours








Test


  7/10/17


17:08 7/10/17


23:15 7/11/17


06:55 Range/Units


 


 


White Blood Count 6.18   4.8-10.8  K/uL


 


Red Blood Count 5.08   4.2-5.4  M/uL


 


Hemoglobin 15.7   12.0-16.0  g/dL


 


Hematocrit 44.6   37-47  %


 


Mean Corpuscular Volume 87.8     fL


 


Mean Corpuscular Hemoglobin 30.9   25-34  pg


 


Mean Corpuscular Hemoglobin


Concent 35.2


  


  


  32-36  g/dl


 


 


Platelet Count 267   130-400  K/uL


 


Mean Platelet Volume 10.1   7.4-10.4  fL


 


Neutrophils (%) (Auto) 55.3    %


 


Lymphocytes (%) (Auto) 30.6    %


 


Monocytes (%) (Auto) 11.7    %


 


Eosinophils (%) (Auto) 1.6    %


 


Basophils (%) (Auto) 0.3    %


 


Neutrophils # (Auto) 3.42   1.4-6.5  K/uL


 


Lymphocytes # (Auto) 1.89   1.2-3.4  K/uL


 


Monocytes # (Auto) 0.72   0.11-0.59  K/uL


 


Eosinophils # (Auto) 0.10   0-0.5  K/uL


 


Basophils # (Auto) 0.02   0-0.2  K/uL


 


RDW Standard Deviation 42.8   36.4-46.3  fL


 


RDW Coefficient of Variation 13.3   11.5-14.5  %


 


Immature Granulocyte % (Auto) 0.5    %


 


Immature Granulocyte # (Auto) 0.03   0.00-0.02  K/uL


 


Prothrombin Time


  9.9


  


  


  9.0-12.0


SECONDS


 


Prothromb Time International


Ratio 0.9


  


  


  0.9-1.1  


 


 


Activated Partial


Thromboplast Time 34.7


  


  


  21.0-31.0


SECONDS


 


Partial Thromboplastin Ratio 1.3    


 


Sodium Level 130  135 136-145  mmol/L


 


Potassium Level 3.6  3.6 3.5-5.1  mmol/L


 


Chloride Level 96  106   mmol/L


 


Carbon Dioxide Level 24  22 21-32  mmol/L


 


Anion Gap 10.0  7.0 3-11  mmol/L


 


Blood Urea Nitrogen 12  14 7-18  mg/dl


 


Creatinine


  1.20


  


  1.30


  0.60-1.20


mg/dl


 


Est Creatinine Clear Calc


Drug Dose 49.8


  


  45.3


   ml/min


 


 


Estimated GFR (


American) 56.5


  


  51.3


   


 


 


Estimated GFR (Non-


American 48.7


  


  44.2


   


 


 


BUN/Creatinine Ratio 10.3  10.9 10-20  


 


Random Glucose 91  119 70-99  mg/dl


 


Osmolality


  263


  


  


  280-300


mOsm/kg


 


Calcium Level 9.0  8.4 8.5-10.1  mg/dl


 


Total Bilirubin 0.3   0.2-1  mg/dl


 


Direct Bilirubin 0.1   0-0.2  mg/dl


 


Aspartate Amino Transf


(AST/SGOT) 14


  


  


  15-37  U/L


 


 


Alanine Aminotransferase


(ALT/SGPT) 26


  


  


  12-78  U/L


 


 


Alkaline Phosphatase 77     U/L


 


Total Creatine Kinase 98     U/L


 


Creatine Kinase MB 1.0   0.5-3.6  ng/ml


 


Creatine Kinase MB Ratio 1.0   0-3.0  


 


Troponin I < 0.015 < 0.015 < 0.015 0-0.045  ng/ml


 


Total Protein 6.9   6.4-8.2  gm/dl


 


Albumin 3.9   3.4-5.0  gm/dl


 


Lipase 566  518   U/L


 


Estimated Average Glucose   100  mg/dl


 


Hemoglobin A1c   5.1 4.5-5.6  %











Diagnostic Radiology


CT of brain without contrast showed no acute findings.  It showed minimal 

subcortical/periventricular microangiopathic changes, atherosclerotic 

calcification of cavernous carotid and vertebral arteries, but no hemorrhage 

and no mass effect.


CXR normal





EKG


EKG findings showed sinus debbie, but no significant changes from comparison EKG 

on 12/4/2016.





Impression


Assessment and Plan


Assessment:


Nayeli Palma is a 62 yo female with PMHx signficant for right renal artery 

stenosis s/p stent, recently right atrophic kidney, CKD stage II, carotid 

stenosis, migraines, and bipolar disoder, presented with low sodium levels, 

secondary to increased fluid intake prior to arrival.  Patient also has HTN 

secondary to her kidney disease, with recent systolic readings in the 180s.  

Patient was given Chlorathalidone on 6/29/2017 to help with her HTN, but since 

taking this medication she has experienced symptoms of hyponatremia.





Plan:


1)Hyponatremia


- Trend Na+ levels, most recent levels: 130 in ED, 135 in telemetry


- Fluid restriction to 1200ml


- UA shows 1.007 osmolality and negative for protein


- Follow low sodium diet


- Do not restart Chlorathalidone and instead begin 20mg Lasix





2) Refractory HTN


- HTN with high systolic BP even on multidrug home med regime (Verapamil Hcl Er 

(Verapamil Hcl) 120 Mg Cap 120 Mg PEG HS, Verapamil Hcl Er (Verapamil Hcl) 240 

Mg Cap 240 Mg PO QAM, Apresoline (Hydralazine Hcl) 50 Mg Tab 100 Mg PO QID, 

Bystolic (Nebivolol Hcl) 20 Mg Tab 40 Mg PO DAILY, and Zestril (Lisinopril) 40 

Mg Tab 40 Mg PO QAM)


- Consult nephrology for further management.  Nephrology suggests patient not 

be prescribed thiazides, to continue Nebivolol, Lisinopril, Verapamil and 

Hydralazine, and start low dose (20mg) furosemide





3) CAD/peripheral vascular disease


- Consult with vascular surgery pertaining to R ICA with 70-80% stenosis 

management.


- Continue at home medications (Lipitor (Atorvastatin Calcium) 40 Mg Tab 40 Mg 

PO QPM, Plavix (Clopidogrel Bisulfate) 75 Mg Tab 75 Mg PO QAM, Verapamil as 

stated above)





4) Elevated lipase


- Initial lipase of 566, with repeat of 518 


- No complaints of abdominal pain or abdominal pain elicited on exam





5) CKD stage III


- Monitor Cr levels, consistent with baseline (1.5).





6) Bipolar disorder


- Continue at home medications (Ativan (Lorazepam) 0.5 Mg Tab 0.5 Mg PO BID PRN 

and Topamax (Topiramate) 50 Mg Tab 50 Mg PO BID).





Level of Care


Telemetry





Advanced Directives


Existing Living Will:  No


Existing Power of :  No





Resuscitation Status


FULL RESUSCITATION





DVT Prophylaxis


SCDs

## 2017-07-11 NOTE — HOSPITALIST PROGRESS NOTE
Hospitalist Progress Note


Date of Service


Jul 11, 2017.





Subjective


Pt evaluation today including:  conversation w/ patient, physical exam, chart 

review, lab review, review of studies, review of inpatient medication list


Pain:  None


PO Intake:  Tolerating PO diet


Voiding:  no voiding problems


Patient reports feeling better.  She still complains of nausea but denies any 

vomiting, and she was able to eat without any issues.  She denies any pain or 

shortness of breath currently.  She states her chills have resolved.  The 

patient denies fevers, chills, sweats, chest pain, palpitations, claudication, 

cough, wheezing, shortness of breath, vomiting, abdominal pain, dysuria, 

hematuria, urinary retention, paralysis, weakness, numbness and tingling.





   Additional Comments:


See HPI for pertinent positives and negatives.  All other systems reviewed and 

negative.





Objective


Vital Signs











  Date Time  Temp Pulse Resp B/P (MAP) Pulse Ox O2 Delivery O2 Flow Rate FiO2


 


7/11/17 12:00      Room Air  


 


7/11/17 11:09 37.0 49 20 146/55 (85) 96   


 


7/11/17 08:00      Room Air  


 


7/11/17 07:40 36.8 52 20 178/72 (107) 97 Room Air  


 


7/11/17 04:05 36.5 51 17 161/68 (99) 96 Room Air  


 


7/11/17 04:00      Room Air  


 


7/11/17 00:00      Room Air  


 


7/10/17 23:31 36.5 55 17 118/63 (81) 97 Room Air  


 


7/10/17 22:06 36.4 52 20 195/68 97 Room Air  


 


7/10/17 20:29  49 18 177/55 96   


 


7/10/17 17:52  78 16 225/79 98 Room Air  


 


7/10/17 17:45  49      


 


7/10/17 14:26 36.6 50 18 161/59 94 Room Air  











Physical Exam


Notes:


General appearance:  +Obese.  Well-developed, well-nourished, no apparent 

distress


Head:  Normocephalic, atraumatic


Eyes:  Normal inspection, PERRL, EOMI


ENT:  Normal ENT inspection, hearing grossly normal, pharynx normal


Neck:  Supple, no JVD, trachea midline


Respiratory/Chest:  Lungs clear to auscultation, normal breath sounds, no 

respiratory distress


Cardiovascular:  +Systolic murmur.  Regular rate & rhythm, no gallop


Abdomen/GI:  Normal bowel sounds, non-tender, soft


Extremities/Musculoskeletal:  Normal inspection, no calf tenderness, no pedal 

edema


Neurological/Psych:  Alert, normal mood/affect, oriented x 3


Skin:  Normal color, warm/dry, no rash





Laboratory Results





Last 24 Hours








Test


  7/10/17


17:08 7/10/17


23:15 7/11/17


06:55


 


White Blood Count 6.18 K/uL   


 


Red Blood Count 5.08 M/uL   


 


Hemoglobin 15.7 g/dL   


 


Hematocrit 44.6 %   


 


Mean Corpuscular Volume 87.8 fL   


 


Mean Corpuscular Hemoglobin 30.9 pg   


 


Mean Corpuscular Hemoglobin


Concent 35.2 g/dl 


  


  


 


 


Platelet Count 267 K/uL   


 


Mean Platelet Volume 10.1 fL   


 


Neutrophils (%) (Auto) 55.3 %   


 


Lymphocytes (%) (Auto) 30.6 %   


 


Monocytes (%) (Auto) 11.7 %   


 


Eosinophils (%) (Auto) 1.6 %   


 


Basophils (%) (Auto) 0.3 %   


 


Neutrophils # (Auto) 3.42 K/uL   


 


Lymphocytes # (Auto) 1.89 K/uL   


 


Monocytes # (Auto) 0.72 K/uL   


 


Eosinophils # (Auto) 0.10 K/uL   


 


Basophils # (Auto) 0.02 K/uL   


 


RDW Standard Deviation 42.8 fL   


 


RDW Coefficient of Variation 13.3 %   


 


Immature Granulocyte % (Auto) 0.5 %   


 


Immature Granulocyte # (Auto) 0.03 K/uL   


 


Prothrombin Time 9.9 SECONDS   


 


Prothromb Time International


Ratio 0.9 


  


  


 


 


Activated Partial


Thromboplast Time 34.7 SECONDS 


  


  


 


 


Partial Thromboplastin Ratio 1.3   


 


Sodium Level 130 mmol/L   135 mmol/L 


 


Potassium Level 3.6 mmol/L   3.6 mmol/L 


 


Chloride Level 96 mmol/L   106 mmol/L 


 


Carbon Dioxide Level 24 mmol/L   22 mmol/L 


 


Anion Gap 10.0 mmol/L   7.0 mmol/L 


 


Blood Urea Nitrogen 12 mg/dl   14 mg/dl 


 


Creatinine 1.20 mg/dl   1.30 mg/dl 


 


Est Creatinine Clear Calc


Drug Dose 49.8 ml/min 


  


  45.3 ml/min 


 


 


Estimated GFR (


American) 56.5 


  


  51.3 


 


 


Estimated GFR (Non-


American 48.7 


  


  44.2 


 


 


BUN/Creatinine Ratio 10.3   10.9 


 


Random Glucose 91 mg/dl   119 mg/dl 


 


Osmolality 263 mOsm/kg   


 


Calcium Level 9.0 mg/dl   8.4 mg/dl 


 


Total Bilirubin 0.3 mg/dl   


 


Direct Bilirubin 0.1 mg/dl   


 


Aspartate Amino Transf


(AST/SGOT) 14 U/L 


  


  


 


 


Alanine Aminotransferase


(ALT/SGPT) 26 U/L 


  


  


 


 


Alkaline Phosphatase 77 U/L   


 


Total Creatine Kinase 98 U/L   


 


Creatine Kinase MB 1.0 ng/ml   


 


Creatine Kinase MB Ratio 1.0   


 


Troponin I < 0.015 ng/ml  < 0.015 ng/ml  < 0.015 ng/ml 


 


Total Protein 6.9 gm/dl   


 


Albumin 3.9 gm/dl   


 


Lipase 566 U/L   518 U/L 


 


Estimated Average Glucose   100 mg/dl 


 


Hemoglobin A1c   5.1 % 











Assessment and Plan


 60 y/o female with PMHx of Secondary HTN due to R Renal Artery Stenosis S/P 

Stent, R Atrophic Kidney, CKD Stage III, Carotid Stenosis, Migraine HAs, and 

Bipolar I Disorder who presents to the ED for hyponatremia x 2 days.





Hyponatremia--improving


-Admit to telemetry.  Pt in sinus bradycardia overnight with HR 40s-50s


-Sodium 130 on admission, had been as low as 123 as outpatient 


-Fluid restriction 1200 mL


-Sodium improved to 135 on 7/11


-Serum and urine osm both low


-Nephrology consulted, appreciate recs:  Avoid thiazide diuretics.  Continue 

hydralazine, lisinopril, verapamil and Bystolic.  Added low dose Lasix.


-Lasix 20 mg PO qd





Secondary HTN 2/2 R Renal Artery Stenosis S/P Stent--improving


- Continue hydralazine 100 mg PO QID, lisinopril 40 mg PO qd, verapamil 240 mg 

PO qam and 120 mg PO qpm, and Bystolic 40 mg PO qd





Elevated Lipase--stable


- Lipase 566 on admission


-Repeat lipase 518.  Denies any abdominal pain





HLD and Carotid Stenosis (L<R)--pt follows with Dr. Dawkins


-Continue atorvastatin 40 mg PO qd and Plavix 75 mg PO qd





CKD Stage III--Baseline Cr 1.0-1.2


-Creatinine stable, around baseline





Bipolar Type I and Migraine HA


- Continue Ativan 0.5 mg BID PRN, Zanaflex 2 mg TID PRN, Topiramate 50 mg BID, 

and Verapamil 240 mg AM and 120 mg PM





DVT prophylaxis


-SCDs





Code Status


-Level I, FULL RESUSCITATION STATUS

## 2017-07-11 NOTE — NEPHROLOGY CONSULTATION
Nephrology Consultation


Date & Providers





Date of Consultation:  Jul 11, 2017.





Primary Care Provider:  Austin Heath M.D.





Referring Provider:





Reason for Consultation


Evaluation of hypertension





History of Present Illness


Mrs. Palma was seen & examined at the request of Dr. Dunne for evaluation of 

hypertension.  Medical records in the hospital and office EMR were reviewed 

today and are summarized as follows:  Mrs. Palma has bipolar disorder, ongoing 

tobacco use, multiple drug intolerances and a longstanding history of HTN.  

Previously her blood pressure had been controlled w/ Atenolol, Verapamil, 

Lisinopril and Hydralazine.  In 06/17 she experienced accelerated HTN and 

required titration of her medications.  Her SBP was ranging 170 - 220 mmHg.  

Vascular surgery evaluation was performed.  Patient was found to have 70 - 80% 

R ICA stenosis, multilevel atherosclerotic lesions within the abdominal aorta, 

occlusion of the SMA and complete occlusion of the right renal artery.  The 

right kidney was atrophic measuring only a few centimeters.  The left renal 

artery had atherosclerotic plaque disease but no hemodynamically significant 

stenosis.  There is an accessory artery to the lower pole which was difficult 

to visualize.  Mrs. Palma was then taken off Lisinopril and referred to 

Nephrology for evaluation.  Serum creatinine has been 1.5.  Urine sediment is 

benign.  UPCR was 1.5.  SIEP, cortisol level, 24 hour urine collection and 

serum aldosterone were ordered and are currently pending.  Patient was started 

back on Lisinopril.  Chlorthalidone 25 mg 1/2 tablet daily was started.  Mrs. Palma reports that she tolerated the Chlorthalidone poorly.  She developed 

recurrent nausea & emesis.  She was seen by her PCP and found to have serum 

sodium 125 mmol/l and admitted to the hospital for further evaluation.  Serum 

sodium has corrected off thiazide diuretic.  Blood pressure remains elevated 

and Nephrology consultation has been requested to provide recommendations on 

patient's antihypertensive regimen





Past Medical/Surgical History


Medical:


#  Stage III CKD w/ baseline Cr 1.5 (EGFR 51 cc/min).  Right kidney is atrophic 

and nonfunctional


#  HTN


#  Diffuse atherosclerotic vascular disease (Atrophic R kidney, R carotid 

stenosis)


#  Ongoing tobacco use


#  Bipolar disorder


Surgical:


#  Appendectomy








Allergies


Coded Allergies:  


     Bupropion (Verified  Allergy, Unknown, UNKNOWN, 11/4/16)


     Carbamazepine (Unverified  Allergy, Unknown, UNKNOWN, 11/4/16)


     Codeine (Verified  Allergy, Unknown, "TIGHTENS MUSCLES UP" PER PT, 11/4/16)


     Penicillins (Verified  Allergy, Unknown, AMPICILLIN, 11/4/16)


     Sertraline (Unverified  Allergy, Unknown, UNKNOWN, 11/4/16)


     Sulfa Antibiotics (Unverified  Allergy, Unknown, HIVES, 11/7/16)


     Tetracycline (Verified  Allergy, Unknown, 11/4/16)


     Venlafaxine (Unverified  Allergy, Unknown, UNKNOWN, 11/4/16)


     Pork (Verified  Adverse Reaction, Intermediate, VOMIT, 11/4/16)


     Chocolate (Verified  Adverse Reaction, Mild, MIGRAINES, 11/4/16)


     Lithium (Verified  Adverse Reaction, Unknown, VIOLENT EMESIS, 11/4/16)





Inpatient Medications





Current Inpatient Medications








 Medications


  (Trade)  Dose


 Ordered  Sig/Roxanne


 Route  Start Time


 Stop Time Status Last Admin


Dose Admin


 


 Acetaminophen


  (Tylenol Tab)  650 mg  Q4H  PRN


 PO  7/10/17 19:15


 8/9/17 19:14  7/11/17 07:47


650 MG


 


 Magnesium


 Hydroxide


  (Milk Of


 Magnesia Susp)  30 ml  Q12H  PRN


 PO  7/10/17 19:15


 8/9/17 19:14   


 


 


 Ondansetron HCl


  (Zofran Inj)  4 mg  Q6H  PRN


 IV  7/10/17 19:15


 8/9/17 19:14  7/11/17 05:38


4 MG


 


 Atorvastatin


 Calcium


  (Lipitor Tab)  40 mg  QPM


 PO  7/10/17 21:00


 8/9/17 20:59  7/10/17 22:25


40 MG


 


 Clopidogrel


 Bisulfate


  (plAVix TAB)  75 mg  QAM


 PO  7/11/17 09:00


 8/10/17 08:59  7/11/17 08:59


75 MG


 


 Folic Acid


  (Folvite Tab)  1 mg  QPM


 PO  7/10/17 21:00


 8/9/17 20:59  7/10/17 22:26


1 MG


 


 Acetaminophen/


 Hydrocodone Bitart


  (Norco 7.5/325


 Tab)  1 tab  BID  PRN


 PO  7/10/17 19:15


 7/24/17 19:14  7/10/17 22:26


1 TAB


 


 Lisinopril


  (Zestril Tab)  40 mg  QAM


 PO  7/11/17 09:00


 8/10/17 08:59  7/11/17 09:01


40 MG


 


 Lorazepam


  (Ativan Tab)  0.5 mg  BID  PRN


 PO  7/10/17 19:15


 8/9/17 19:14   


 


 


 Tizanidine HCl


  (Zanaflex Tab)  2 mg  TID  PRN


 PO  7/10/17 19:15


 8/9/17 19:14   


 


 


 Topiramate


  (Topamax Tab)  50 mg  BID


 PO  7/10/17 21:00


 8/9/17 20:59  7/11/17 09:00


50 MG


 


 Vitamin B Complex


  (Vitamin B


 Complex)  1 tab  DAILY


 PO  7/11/17 09:00


 8/10/17 08:59  7/11/17 09:01


1 TAB


 


 Nebivolol


  (Bystolic Tab)  40 mg  DAILY


 PO  7/11/17 09:00


 8/10/17 08:59  7/11/17 09:03


40 MG


 


 Verapamil HCl


  (Calan-Sr Tab)  120 mg  HS


 PO  7/10/17 21:00


 8/9/17 20:59  7/10/17 22:26


120 MG


 


 Verapamil HCl


  (Calan-Sr Tab)  240 mg  QAM


 PO  7/11/17 09:00


 8/10/17 08:59  7/11/17 09:03


240 MG


 


 Hydralazine HCl


  (Apresoline Tab)  100 mg  0000,0600,1200,1800


 PO  7/11/17 06:00


 8/10/17 05:59  7/11/17 05:37


100 MG











Family History





Diabetes mellitus





Positive for HTN.  Negative for CKD / ESRD





Social History


Smoking Status:  Light Tobacco Smoker


Drug Use:  none


Marital Status:  


Occupation:  employed





.  Current smoker





Review of Systems


Constitutional:  No fever


Respiratory:  No shortness of breath


Cardiovascular:  No chest pain


A complete review of systems was performed.  Pertinent positives are noted 

above. All other systems are negative.





Physical Exam











  Date Time  Temp Pulse Resp B/P (MAP) Pulse Ox O2 Delivery O2 Flow Rate FiO2


 


7/11/17 08:00      Room Air  


 


7/11/17 07:40 36.8 52 20 178/72 (107) 97 Room Air  


 


7/11/17 04:05 36.5 51 17 161/68 (99) 96 Room Air  


 


7/11/17 04:00      Room Air  


 


7/11/17 00:00      Room Air  


 


7/10/17 23:31 36.5 55 17 118/63 (81) 97 Room Air  


 


7/10/17 22:06 36.4 52 20 195/68 97 Room Air  


 


7/10/17 20:29  49 18 177/55 96   


 


7/10/17 17:52  78 16 225/79 98 Room Air  


 


7/10/17 17:45  49      


 


7/10/17 14:26 36.6 50 18 161/59 94 Room Air  








General Appearance:  no apparent distress


Head:  normocephalic, atraumatic


Eyes:  PERRL, EOMI


Neck:  no adenopathy, + pertinent finding (harsh bilateral carotid artery bruit)


Respiratory/Chest:  lungs clear


Cardiovascular:  regular rate, rhythm


Abdomen/GI:  normal bowel sounds, non tender, soft, + pertinent finding (

Midepigastric arterial bruit w/ radiation to the RUQ.  No bruit over LUQ)


Extremities/Musculoskelatal:  no calf tenderness, no pedal edema


Neurologic/Psych:  alert, oriented x 3





Laboratory Results





Last 24 Hours








Test


  7/10/17


17:08 7/10/17


23:15 7/11/17


06:55


 


White Blood Count 6.18 K/uL   


 


Red Blood Count 5.08 M/uL   


 


Hemoglobin 15.7 g/dL   


 


Hematocrit 44.6 %   


 


Mean Corpuscular Volume 87.8 fL   


 


Mean Corpuscular Hemoglobin 30.9 pg   


 


Mean Corpuscular Hemoglobin


Concent 35.2 g/dl 


  


  


 


 


Platelet Count 267 K/uL   


 


Mean Platelet Volume 10.1 fL   


 


Neutrophils (%) (Auto) 55.3 %   


 


Lymphocytes (%) (Auto) 30.6 %   


 


Monocytes (%) (Auto) 11.7 %   


 


Eosinophils (%) (Auto) 1.6 %   


 


Basophils (%) (Auto) 0.3 %   


 


Neutrophils # (Auto) 3.42 K/uL   


 


Lymphocytes # (Auto) 1.89 K/uL   


 


Monocytes # (Auto) 0.72 K/uL   


 


Eosinophils # (Auto) 0.10 K/uL   


 


Basophils # (Auto) 0.02 K/uL   


 


RDW Standard Deviation 42.8 fL   


 


RDW Coefficient of Variation 13.3 %   


 


Immature Granulocyte % (Auto) 0.5 %   


 


Immature Granulocyte # (Auto) 0.03 K/uL   


 


Prothrombin Time 9.9 SECONDS   


 


Prothromb Time International


Ratio 0.9 


  


  


 


 


Activated Partial


Thromboplast Time 34.7 SECONDS 


  


  


 


 


Partial Thromboplastin Ratio 1.3   


 


Sodium Level 130 mmol/L   135 mmol/L 


 


Potassium Level 3.6 mmol/L   3.6 mmol/L 


 


Chloride Level 96 mmol/L   106 mmol/L 


 


Carbon Dioxide Level 24 mmol/L   22 mmol/L 


 


Anion Gap 10.0 mmol/L   7.0 mmol/L 


 


Blood Urea Nitrogen 12 mg/dl   14 mg/dl 


 


Creatinine 1.20 mg/dl   1.30 mg/dl 


 


Est Creatinine Clear Calc


Drug Dose 49.8 ml/min 


  


  45.3 ml/min 


 


 


Estimated GFR (


American) 56.5 


  


  51.3 


 


 


Estimated GFR (Non-


American 48.7 


  


  44.2 


 


 


BUN/Creatinine Ratio 10.3   10.9 


 


Random Glucose 91 mg/dl   119 mg/dl 


 


Osmolality 263 mOsm/kg   


 


Calcium Level 9.0 mg/dl   8.4 mg/dl 


 


Total Bilirubin 0.3 mg/dl   


 


Direct Bilirubin 0.1 mg/dl   


 


Aspartate Amino Transf


(AST/SGOT) 14 U/L 


  


  


 


 


Alanine Aminotransferase


(ALT/SGPT) 26 U/L 


  


  


 


 


Alkaline Phosphatase 77 U/L   


 


Total Creatine Kinase 98 U/L   


 


Creatine Kinase MB 1.0 ng/ml   


 


Creatine Kinase MB Ratio 1.0   


 


Troponin I < 0.015 ng/ml  < 0.015 ng/ml  < 0.015 ng/ml 


 


Total Protein 6.9 gm/dl   


 


Albumin 3.9 gm/dl   


 


Lipase 566 U/L   518 U/L 


 


Estimated Average Glucose   100 mg/dl 


 


Hemoglobin A1c   5.1 % 











Impression





(1) Chronic kidney disease


(2) Atrophy of right kidney


(3) Peripheral vascular disease


(4) Atherosclerotic renal artery stenosis, unilateral


(5) Hypertension


(6) Hyponatremia





Mrs. Palma was admitted w/ thiazide induced hyponatremia and accelerated HTN.  

Serum sodium has normalized off Chlorthalidone.  Blood pressure remains 

elevated despite beta blocker, ACE inhibitor and several vasodilators.  Right 

kidney is atrophic and nonfunctional due to FABIEN.  06/17 imaging studies 

revealed only plaque disease involving the L renal artery.





Recommendations


HYPERTENSION:


-- Avoid thiazide diuretics (HCTZ, Chlorthalidone)


-- Continue Nebivolol, Lisinopril, Verapamil and Hydralazine


-- Will add low dose Furosemide


-- No clinical symptoms to suggest Cushings disease or Pheochromocytoma


-- Await results of TSH and Aldosterone 





CHRONIC KIDNEY DISEASE:


-- Atrophic R kidney w/ baseline creatinine 1.5


-- Patient turned in 24 hour urine collection yesterday.  Will await results


-- Suspect proteinuria related to longstanding HTN.  Urine sediment was benign.

  Await UPEP results





PVD:


-- Recommend consultation w/ vascular surgery to determine management plan for 

R ICA 70 - 80% stenosis





OTHER:


-- Recommend smoking cessation consult

## 2017-07-12 VITALS
HEART RATE: 52 BPM | OXYGEN SATURATION: 94 % | TEMPERATURE: 98.06 F | SYSTOLIC BLOOD PRESSURE: 146 MMHG | DIASTOLIC BLOOD PRESSURE: 54 MMHG

## 2017-07-12 VITALS
DIASTOLIC BLOOD PRESSURE: 43 MMHG | SYSTOLIC BLOOD PRESSURE: 121 MMHG | OXYGEN SATURATION: 96 % | TEMPERATURE: 97.88 F | HEART RATE: 51 BPM

## 2017-07-12 VITALS
HEART RATE: 50 BPM | OXYGEN SATURATION: 97 % | TEMPERATURE: 98.24 F | SYSTOLIC BLOOD PRESSURE: 123 MMHG | DIASTOLIC BLOOD PRESSURE: 61 MMHG

## 2017-07-12 VITALS
SYSTOLIC BLOOD PRESSURE: 146 MMHG | HEART RATE: 52 BPM | TEMPERATURE: 98.06 F | OXYGEN SATURATION: 94 % | DIASTOLIC BLOOD PRESSURE: 54 MMHG

## 2017-07-12 LAB
ALBUMIN MFR UR ELPH: 65.15 %
ALBUMIN SERPL-MCNC: 4.2 G/DL (ref 3.8–4.8)
ALPHA-2-GLOBULIN %: 9.94 %
ANION GAP SERPL CALC-SCNC: 10 MMOL/L (ref 3–11)
BETA GLOBULIN %: 11.3 %
BETA-2-GLOBULIN: 0.3 G/DL (ref 0.2–0.5)
BUN SERPL-MCNC: 15 MG/DL (ref 7–18)
BUN/CREAT SERPL: 10.8 (ref 10–20)
CALCIUM SERPL-MCNC: 8.6 MG/DL (ref 8.5–10.1)
CHLORIDE SERPL-SCNC: 108 MMOL/L (ref 98–107)
CO2 SERPL-SCNC: 20 MMOL/L (ref 21–32)
CREAT CL PREDICTED SERPL C-G-VRATE: 42.5 ML/MIN
CREAT SERPL-MCNC: 1.4 MG/DL (ref 0.6–1.2)
CREAT UR-MCNC: 27 MG/DL (ref 20–320)
ELECTROPHORESIS INTERPRET: (no result)
EOSINOPHIL NFR BLD AUTO: 254 K/UL (ref 130–400)
GAMMA GLOB 24H MFR UR ELPH: 4.52 %
GAMMA GLOB SERPL ELPH-MCNC: 0.5 G/DL (ref 0.8–1.7)
GLUCOSE SERPL-MCNC: 102 MG/DL (ref 70–99)
HCT VFR BLD CALC: 39.8 % (ref 37–47)
IMP & REVIEW OF LAB RESULTS: (no result)
MCH RBC QN AUTO: 29.2 PG (ref 25–34)
MCHC RBC AUTO-ENTMCNC: 32.9 G/DL (ref 32–36)
MCV RBC AUTO: 88.8 FL (ref 80–100)
PMV BLD AUTO: 9.8 FL (ref 7.4–10.4)
POTASSIUM SERPL-SCNC: 4 MMOL/L (ref 3.5–5.1)
PROT SERPL-MCNC: 6.7 G/DL (ref 6.2–8.3)
RBC # BLD AUTO: 4.48 M/UL (ref 4.2–5.4)
SODIUM SERPL-SCNC: 138 MMOL/L (ref 136–145)
WBC # BLD AUTO: 6.44 K/UL (ref 4.8–10.8)

## 2017-07-12 RX ADMIN — NEBIVOLOL HYDROCHLORIDE SCH MG: 5 TABLET ORAL at 08:44

## 2017-07-12 RX ADMIN — CLOPIDOGREL BISULFATE SCH MG: 75 TABLET, FILM COATED ORAL at 08:45

## 2017-07-12 RX ADMIN — TOPIRAMATE SCH MG: 25 TABLET, FILM COATED ORAL at 08:44

## 2017-07-12 RX ADMIN — Medication SCH TAB: at 08:44

## 2017-07-12 RX ADMIN — LISINOPRIL SCH MG: 40 TABLET ORAL at 08:44

## 2017-07-12 NOTE — DISCHARGE INSTRUCTIONS
Discharge Instructions


Date of Service


Jul 12, 2017.





Admission


Reason for Admission:  Hyponatremia





Discharge


Discharge Diagnosis / Problem:  Hyponatremia, hypertension





Discharge Goals


Goal(s):  Decrease discomfort, Improve function, Diagnostic testing, 

Therapeutic intervention





Activity Recommendations


Activity Limitations:  resume your previous activity





.





Instructions / Follow-Up


Instructions / Follow-Up


You were admitted to the hospital after presenting with hyponatremia (low 

sodium in the blood) and uncontrolled hypertension (high blood pressure).  You 

placed on a strict fluid restriction as well as a diuretic called Lasix, which 

improved your sodium and brought it back to normal levels.  Your blood pressure 

also improved during your stay.  You are now medically stable for discharge.





Medications:





*Please take Lasix (furosemide) 20 mg by mouth daily.





*Your verapamil dose has been decreased to 120 mg by mouth twice a day.  STOP 

taking verapamil 240 mg in the morning.





*Continue your other home medications as prescribed.





Recommendations:





*Continue to watch your fluid intake/fluid restriction as you had been doing 

prior to arriving to the hospital.





Follow up:





*You have been scheduled to follow up at your primary care provider's office 

with Genny Najera PA-C on Friday, July 14 at 2:00 pm.





*Maintain your previous appointment with Dr. Sutton on Monday, July 17 at 11:

15 am.





Please seek medical attention if you experience fevers, chills, sweats, 

dizziness/lightheadedness, loss of consciousness, chest pain, shortness of 

breath, nausea, vomiting, numbness or tingling.





Current Hospital Diet


Patient's current hospital diet: Renal Diet, Regular Diet, Gluten Free Diet





Discharge Diet


Recommended Diet:  Renal Diet


Fluid Restriction:  1500 ml (6 cups)





Pending Studies


Studies pending at discharge:  no





Laboratory Results





Hemoglobin A1c








Test


  7/11/17


06:55 Range/Units


 


 


Estimated Average Glucose 100   mg/dl


 


Hemoglobin A1c 5.1  4.5-5.6  %











Medical Emergencies








.


Who to Call and When:





Medical Emergencies:  If at any time you feel your situation is an emergency, 

please call 911 immediately.





.





Non-Emergent Contact


Non-Emergency issues call your:  Primary Care Provider, Nephrologist


Call Non-Emergent contact if:  you have a fever, you have any medication 

questions





.





Past History


Medical & Surgical History:  


(1) Hyponatremia


(2) Hypertension


(3) Chronic kidney disease


.








"Provider Documentation" section prepared by Erica Santos.








.





VTE Core Measure


Inpt VTE Proph given/why not?:  T.E.D. Stockings, HAROLDO's

## 2017-07-12 NOTE — DISCHARGE SUMMARY
Discharge Summary


Date of Service


Jul 12, 2017.


 (Erica Santos .LAWRENCE)





Discharge Summary


Admission Date:


Jul 10, 2017 at 19:33


Discharge Date:  Jul 12, 2017


Discharge Disposition:  Home


Principal Diagnosis:  Hyponatremia, hypertension


Problems/Secondary Diagnoses:


(1) Hypertension


Status: Chronic  








Immunizations:  


   Have You Had Influenza Vaccine:  No


   History of Tetanus Vaccine?:  UTD


   History of Pneumococcal:  No


   History of Hepatitis B Vaccine:  No


Procedures:


CT SCAN OF THE BRAIN WITHOUT IV CONTRAST





CLINICAL HISTORY: Headache.





COMPARISON STUDY:  CT the brain dated 7/23/2012.





TECHNIQUE: Unenhanced axial CT scan of the brain is performed from the vertex to


the skull base. Automated dose control exposure was utilized.





CT DOSE: 537.48 mGy.cm





FINDINGS:





Brain parenchyma: There is minimal subcortical and periventricular


microangiopathic change. The brain parenchyma is otherwise normal in appearance.


There is no hemorrhage, mass effect, or evidence of acute territorial ischemia


by CT criteria. Gray-white matter is preserved. No extra-axial fluid collection


is seen.





Ventricles, sulci, cisterns: Normal in configuration.





Intracranial vasculature: There is atherosclerotic calcification of the


cavernous carotid and vertebral arteries.





Calvarium: Unremarkable.





Sinuses and mastoids: The visualized paranasal sinuses are clear. The mastoid


air cells are well pneumatized.





Orbits: The bony orbits are grossly intact.








IMPRESSION: There is no hemorrhage, mass effect, or evidence of acute


territorial ischemia by CT criteria.

















CHEST ONE VIEW PORTABLE





CLINICAL HISTORY: Hypertension    





COMPARISON STUDY:  12/4/2012





FINDINGS: The heart is borderline enlarged. There is mild hilar prominence,


likely secondary to prominent central pulmonary arteries. Pulmonary arterial


hypertension must be considered.[ There is no failure. There is no focal


pulmonary consolidation. No pleural effusions are visualized.





IMPRESSION: AP portable study. No evidence of failure. No evidence of focal


pulmonary consolidation. Prominent hilar/central pulmonary arteries.


Consultations:


Nephrology--Dr. Navarro


 (Erica Santos ., PA-C)





Medication Reconciliation


New Medications:  


Furosemide (Furosemide) 20 Mg Tab


20 MG PO QAM for 30 Days, #30 TAB





 


Changed Medications:  


Verapamil Hcl (Verapamil Hcl Er) 120 Mg Cap


120 MG PO BID for 30 Days, #60 CAP (Changed from: PEG; HS)


Take 1 capsule twice a day.


 


Continued Medications:  


Atorvastatin (Lipitor) 40 Mg Tab


40 MG PO QPM, TAB





B-Complex Vitamins (Vitamin B Complex) 1 Tab Tab


1 TAB PO DAILY





Clopidogrel Bisulfate (Plavix) 75 Mg Tab


75 MG PO QAM, TAB





Ergocalciferol (Vitamin D 27245 Unit) 50,000 Unit Cap


72343 INTER.UNIT PO WK, CAP


TAKE THIS MEDICATION EVERY SUMDAY


Folic Acid (Folvite) 1 Mg Tab


1 MG PO QPM, TAB





Hydralazine Hcl (Apresoline) 50 Mg Tab


100 MG PO QID, TAB





Hydrocodone/Acetaminophen 7.5MG/325MG (Norco 7.5MG/325MG)  Tab


1 TAB PO BID PRN for Pain, TAB





Lisinopril (Zestril) 40 Mg Tab


40 MG PO QAM, TAB





Lorazepam (Ativan) 0.5 Mg Tab


0.5 MG PO BID PRN for Anxiety, TAB





Nebivolol Hcl (Bystolic) 20 Mg Tab


40 MG PO DAILY, TAB





Tizanidine (Zanaflex) 2 Mg Cap


2 MG PO TID PRN for Muscle Relaxer, CAP





Topiramate (Topamax) 50 Mg Tab


50 MG PO BID, TAB





 


Discontinued Medications:  


Verapamil Hcl (Verapamil Hcl Er) 240 Mg Cap


240 MG PO QAM, CAP











Discharge Exam


Patient reports feeling well.  She denies any complaints and her nausea has 

resolved.  She is tolerating a PO diet well and urinating without difficulties.

  The patient denies fevers, chills, sweats, chest pain, palpitations, 

claudication, cough, wheezing, shortness of breath, nausea, vomiting, abdominal 

pain, dysuria, hematuria, urinary retention, paralysis, weakness, numbness and 

tingling.


Review of Systems:  


   Constitutional:  No fever, No chills, No sweats


   Eyes:  No worsening of vision, No eye pain, No diplopia


   ENT:  No hearing loss, No sore throat, No trouble swallowing


   Respiratory:  No cough, No wheezing, No shortness of breath


   Cardiovascular:  No chest pain, No claudication, No palpitations


   Abdomen:  No pain, No nausea, No vomiting


   Musculoskeletal:  No joint pain, No muscle pain, No calf pain


   Genitourinary - Female:  No dysuria, No urinary retention, No hematuria


   Neurologic:  No paralysis, No weakness, No numbness/tingling


   Integumentary:  No rash, No itch, No color change


Physical Exam:  


   General Appearance:  WD/WN, no apparent distress, + obese


   Eyes:  normal inspection, PERRL, EOMI


   ENT:  normal ENT inspection, hearing grossly normal, pharynx normal


   Neck:  supple, no JVD, trachea midline


   Respiratory/Chest:  normal breath sounds, no respiratory distress, + 

decreased breath sounds


   Cardiovascular:  no gallop, + bradycardia (regular rhythm), + systolic murmur


   Abdomen / GI:  normal bowel sounds, non tender, soft


   Extremities:  normal inspection, no calf tenderness, no pedal edema


   Neurologic/Psychiatric:  alert, normal mood/affect, oriented x 3


   Skin:  normal color, warm/dry, no rash


 (Erica Santos ., [place])





Hospital Course


 60 y/o female with PMHx of Secondary HTN due to R Renal Artery Stenosis S/P 

Stent, R Atrophic Kidney, CKD Stage III, Carotid Stenosis, Migraine HAs, and 

Bipolar I Disorder who presents to the ED for hyponatremia x 2 days.





Hyponatremia--resolved


-Admit to telemetry.  Pt in sinus bradycardia overnight with HR 40s-50s.  This 

is reportedly baseline.


-Sodium 130 on admission, had been as low as 123 as outpatient 


-Fluid restriction 1200 mL.  Recommended pt continue with fluid restriction 

after discharge


-Sodium improved to 135 on 7/11, up to 138 on 7/12


-Serum and urine osm both low


-Nephrology consulted, appreciate recs:  Avoid thiazide diuretics.  Continue 

hydralazine, lisinopril, and Bystolic.  Added low dose Lasix.  Decrease 

verapamil to 120 mg PO BID.


-Lasix 20 mg PO qd





Secondary HTN 2/2 R Renal Artery Stenosis S/P Stent--improving


- Continue hydralazine 100 mg PO QID, lisinopril 40 mg PO qd, verapamil 120 mg 

PO BID and Bystolic 40 mg PO qd.  Lasix as above.





Elevated Lipase--stable


- Lipase 566 on admission


-Repeat lipase 518.  Denies any abdominal pain





HLD and Carotid Stenosis (L<R)--pt follows with Dr. Dawkins


-Continue atorvastatin 40 mg PO qd and Plavix 75 mg PO qd





CKD Stage III--Baseline Cr 1.0-1.2


-Creatinine stable, around baseline





Bipolar Type I and Migraine HA


- Continue Ativan 0.5 mg BID PRN, Zanaflex 2 mg TID PRN, Topiramate 50 mg BID, 

and Verapamil 120 mg PO BID





DVT prophylaxis


-SCDs





Code Status


-Level I, FULL RESUSCITATION STATUS





Dispo


-Pt stable for d/c to home


-Scheduled to f/u with Dr. Sutton (nephrology) 7/17 and PCP office 7/14


Total Time Spent:  Greater than 30 minutes


This includes examination of the patient, discharge planning, medication 

reconciliation, and communication with other providers.


 (Erica Santos ., PA-C)





I agree with PA assessment and plan and have seen and examined pt myself


Resting comfortably in bed


Labs and vitals reviewed


BP well managed


DC on lasix and reduced verapamil dose


F/U with Dr Sutton 


DO NOT TAKE anymore thiazides/chlorthalidone


 (Moisés Dunne, D.O.)


Discharge Instructions


Please refer to the electronic Patient Visit Report (Discharge Instructions) 

for additional information.


 (Erica Santos ., PA-C)





Additional Copies To


Austin Heath M.D.; Genny Najera P.A.

## 2017-07-12 NOTE — NEPHROLOGY PROGRESS NOTE
Nephrology Progress Note


Date of Service


Jul 12, 2017.





Chief Complaint


Follow up evaluation of hypertension





Subjective


Mrs. Palma was seen & examined in the PCU this morning.  She tolerated 

Furosemide yesterday without side effect.  She did not notice any increase in 

urine output.  She currently voices no medical concerns.  She had resting 

bradycardia overnight w/ HR 40 - 50 bpm.  She has not yet been up ambulating in 

the hallway.





Review of Systems


Constitutional:  No fever


Cardiovascular:  No chest pain


Respiratory:  No dyspnea at rest


Abdomen:  No pain, No nausea, No vomiting


Extremities:  No leg edema


A complete review of systems was performed.  Pertinent positives are noted 

above. All other systems are negative.





Vital Signs





Last 8 Hrs








  Date Time  Temp Pulse Resp B/P (MAP) Pulse Ox O2 Delivery O2 Flow Rate FiO2


 


7/12/17 08:00      Room Air  


 


7/12/17 07:13 36.8 50 18 123/61 (81) 97 Room Air  


 


7/12/17 04:00      Room Air  


 


7/12/17 03:28 36.6 51 20 121/43 (69) 96 Room Air  











Last Recorded Weight


Weight (Kilograms):  80.800





Physical Exam


General Appearance:  no apparent distress


Head:  normocephalic, atraumatic


Eyes:  PERRL


Neck:  + pertinent finding (bilateral carotid artery bruit R > L)


Respiratory/Chest:  lungs clear


Cardiovascular:  + bradycardia (harsh systolic murmur)


Abdomen/GI:  normal bowel sounds, non tender, soft


Extremities/Musculoskelatal:  no calf tenderness, no pedal edema


Neurologic/Psych:  alert, oriented x 3





Family History





Diabetes mellitus





Positive for HTN.  Negative for CKD / ESRD





Social History


Smoking Status:  Current every day smoker


Drug Use:  none


Marital Status:  


Occupation:  employed





.  Current smoker





Laboratory Results


Past 24 Hours


7/12/17 06:23








7/12/17 06:23

















Test


  7/12/17


06:23


 


Red Blood Count


  4.48 M/uL


(4.2-5.4)


 


Mean Corpuscular Volume


  88.8 fL


()


 


Mean Corpuscular Hemoglobin


  29.2 pg


(25-34)


 


Mean Corpuscular Hemoglobin


Concent 32.9 g/dl


(32-36)


 


RDW Standard Deviation


  44.8 fL


(36.4-46.3)


 


RDW Coefficient of Variation


  13.7 %


(11.5-14.5)


 


Mean Platelet Volume


  9.8 fL


(7.4-10.4)


 


Anion Gap


  10.0 mmol/L


(3-11)


 


Est Creatinine Clear Calc


Drug Dose 42.5 ml/min 


 


 


Estimated GFR (


American) 46.9 


 


 


Estimated GFR (Non-


American 40.5 


 


 


BUN/Creatinine Ratio 10.8 (10-20) 


 


Calcium Level


  8.6 mg/dl


(8.5-10.1)











Allergies


Coded Allergies:  


     Bupropion (Verified  Allergy, Unknown, UNKNOWN, 11/4/16)


     Carbamazepine (Unverified  Allergy, Unknown, UNKNOWN, 11/4/16)


     Codeine (Verified  Allergy, Unknown, "TIGHTENS MUSCLES UP" PER PT, 11/4/16)


     Penicillins (Verified  Allergy, Unknown, AMPICILLIN, 11/4/16)


     Sertraline (Unverified  Allergy, Unknown, UNKNOWN, 11/4/16)


     Sulfa Antibiotics (Unverified  Allergy, Unknown, HIVES, 11/7/16)


     Tetracycline (Verified  Allergy, Unknown, 11/4/16)


     Venlafaxine (Unverified  Allergy, Unknown, UNKNOWN, 11/4/16)


     Pork (Verified  Adverse Reaction, Intermediate, VOMIT, 11/4/16)


     Chocolate (Verified  Adverse Reaction, Mild, MIGRAINES, 11/4/16)


     Lithium (Verified  Adverse Reaction, Unknown, VIOLENT EMESIS, 11/4/16)





Medications





Current Inpatient Medications








 Medications


  (Trade)  Dose


 Ordered  Sig/Roxanne


 Route  Start Time


 Stop Time Status Last Admin


Dose Admin


 


 Acetaminophen


  (Tylenol Tab)  650 mg  Q4H  PRN


 PO  7/10/17 19:15


 8/9/17 19:14  7/11/17 21:15


650 MG


 


 Magnesium


 Hydroxide


  (Milk Of


 Magnesia Susp)  30 ml  Q12H  PRN


 PO  7/10/17 19:15


 8/9/17 19:14   


 


 


 Ondansetron HCl


  (Zofran Inj)  4 mg  Q6H  PRN


 IV  7/10/17 19:15


 8/9/17 19:14  7/11/17 05:38


4 MG


 


 Atorvastatin


 Calcium


  (Lipitor Tab)  40 mg  QPM


 PO  7/10/17 21:00


 8/9/17 20:59  7/11/17 21:16


40 MG


 


 Clopidogrel


 Bisulfate


  (plAVix TAB)  75 mg  QAM


 PO  7/11/17 09:00


 8/10/17 08:59  7/11/17 08:59


75 MG


 


 Folic Acid


  (Folvite Tab)  1 mg  QPM


 PO  7/10/17 21:00


 8/9/17 20:59  7/11/17 21:16


1 MG


 


 Acetaminophen/


 Hydrocodone Bitart


  (Norco 7.5/325


 Tab)  1 tab  BID  PRN


 PO  7/10/17 19:15


 7/24/17 19:14  7/10/17 22:26


1 TAB


 


 Lisinopril


  (Zestril Tab)  40 mg  QAM


 PO  7/11/17 09:00


 8/10/17 08:59  7/11/17 09:01


40 MG


 


 Lorazepam


  (Ativan Tab)  0.5 mg  BID  PRN


 PO  7/10/17 19:15


 8/9/17 19:14   


 


 


 Tizanidine HCl


  (Zanaflex Tab)  2 mg  TID  PRN


 PO  7/10/17 19:15


 8/9/17 19:14   


 


 


 Topiramate


  (Topamax Tab)  50 mg  BID


 PO  7/10/17 21:00


 8/9/17 20:59  7/11/17 21:16


50 MG


 


 Vitamin B Complex


  (Vitamin B


 Complex)  1 tab  DAILY


 PO  7/11/17 09:00


 8/10/17 08:59  7/11/17 09:01


1 TAB


 


 Nebivolol


  (Bystolic Tab)  40 mg  DAILY


 PO  7/11/17 09:00


 8/10/17 08:59  7/11/17 09:03


40 MG


 


 Verapamil HCl


  (Calan-Sr Tab)  120 mg  HS


 PO  7/10/17 21:00


 8/9/17 20:59  7/11/17 21:17


120 MG


 


 Verapamil HCl


  (Calan-Sr Tab)  240 mg  QAM


 PO  7/11/17 09:00


 8/10/17 08:59  7/11/17 09:03


240 MG


 


 Hydralazine HCl


  (Apresoline Tab)  100 mg  0000,0600,1200,1800


 PO  7/11/17 06:00


 8/10/17 05:59  7/12/17 05:57


100 MG


 


 Furosemide


  (Lasix Tab)  20 mg  QAM


 PO  7/12/17 09:00


 8/11/17 08:59   


 











Impression





(1) Chronic kidney disease


(2) Atrophy of right kidney


(3) Peripheral vascular disease


(4) Atherosclerotic renal artery stenosis, unilateral


(5) Hypertension


(6) Hyponatremia





Mrs. Palma was admitted w/ thiazide induced hyponatremia and accelerated HTN.  

Serum sodium has normalized off Chlorthalidone.  Blood pressure remains 

elevated despite beta blocker, ACE inhibitor and several vasodilators.  Right 

kidney is atrophic and nonfunctional due to FABIEN.  06/17 imaging studies 

revealed only plaque disease involving the L renal artery.





Recommendations


HYPERTENSION:


-- Avoid thiazide diuretics (HCTZ, Chlorthalidone)


-- Continue Nebivolol, Lisinopril and Hydralazine


-- Continue low dose Furosemide


-- Will reduce Verapamil to 120 mg po BID and monitor HR, BP


-- No clinical symptoms to suggest Cushings disease or Pheochromocytoma


-- Cortisol - normal, TSH - normal.  Aldosterone level - pending





CHRONIC KIDNEY DISEASE:


-- Atrophic R kidney w/ baseline creatinine 1.5


-- Patient turned in 24 hour urine collection - 240 mg protein / day.  UIEP - 

pending


-- Suspect proteinuria related to longstanding HTN.  Urine sediment was benign.

  Await UPEP results





PVD:


-- Recommend consultation w/ vascular surgery to determine management plan for 

R ICA 70 - 80% stenosis





OTHER:


-- Recommend smoking cessation consult

## 2017-07-14 ENCOUNTER — HOSPITAL ENCOUNTER (OUTPATIENT)
Dept: HOSPITAL 45 - C.LABBFT | Age: 62
Discharge: HOME | End: 2017-07-14
Attending: PHYSICIAN ASSISTANT
Payer: COMMERCIAL

## 2017-07-14 DIAGNOSIS — E87.1: Primary | ICD-10-CM

## 2017-07-14 LAB
ANION GAP SERPL CALC-SCNC: 6 MMOL/L (ref 3–11)
BUN SERPL-MCNC: 16 MG/DL (ref 7–18)
BUN/CREAT SERPL: 10.6 (ref 10–20)
CALCIUM SERPL-MCNC: 9 MG/DL (ref 8.5–10.1)
CHLORIDE SERPL-SCNC: 107 MMOL/L (ref 98–107)
CO2 SERPL-SCNC: 26 MMOL/L (ref 21–32)
CREAT SERPL-MCNC: 1.5 MG/DL (ref 0.6–1.2)
GLUCOSE SERPL-MCNC: 92 MG/DL (ref 70–99)
POTASSIUM SERPL-SCNC: 4 MMOL/L (ref 3.5–5.1)
SODIUM SERPL-SCNC: 139 MMOL/L (ref 136–145)

## 2017-09-18 ENCOUNTER — HOSPITAL ENCOUNTER (OUTPATIENT)
Dept: HOSPITAL 45 - C.LAB1850 | Age: 62
Discharge: HOME | End: 2017-09-18
Attending: INTERNAL MEDICINE
Payer: COMMERCIAL

## 2017-09-18 DIAGNOSIS — I10: ICD-10-CM

## 2017-09-18 DIAGNOSIS — R94.39: Primary | ICD-10-CM

## 2017-09-18 LAB
ANION GAP SERPL CALC-SCNC: 9 MMOL/L (ref 3–11)
BUN SERPL-MCNC: 13 MG/DL (ref 7–18)
BUN/CREAT SERPL: 8.9 (ref 10–20)
CALCIUM SERPL-MCNC: 9.2 MG/DL (ref 8.5–10.1)
CHLORIDE SERPL-SCNC: 109 MMOL/L (ref 98–107)
CO2 SERPL-SCNC: 22 MMOL/L (ref 21–32)
CREAT SERPL-MCNC: 1.5 MG/DL (ref 0.6–1.2)
GLUCOSE SERPL-MCNC: 94 MG/DL (ref 70–99)
PHOSPHATE SERPL-MCNC: 3.3 MG/DL (ref 2.5–4.9)
POTASSIUM SERPL-SCNC: 3.6 MMOL/L (ref 3.5–5.1)
SODIUM SERPL-SCNC: 140 MMOL/L (ref 136–145)

## 2017-09-21 LAB
EOSINOPHIL NFR BLD AUTO: 276 K/UL (ref 130–400)
HCT VFR BLD CALC: 43.3 % (ref 37–47)
INR PPP: 0.9 (ref 0.9–1.1)
MCH RBC QN AUTO: 30.6 PG (ref 25–34)
MCHC RBC AUTO-ENTMCNC: 32.6 G/DL (ref 32–36)
MCV RBC AUTO: 93.9 FL (ref 80–100)
PMV BLD AUTO: 10.6 FL (ref 7.4–10.4)
PROTHROMBIN TIME: 9.8 SECONDS (ref 9–12)
RBC # BLD AUTO: 4.61 M/UL (ref 4.2–5.4)
WBC # BLD AUTO: 7.05 K/UL (ref 4.8–10.8)

## 2017-10-05 ENCOUNTER — HOSPITAL ENCOUNTER (OUTPATIENT)
Dept: HOSPITAL 45 - C.CATH | Age: 62
Setting detail: OBSERVATION
LOS: 1 days | Discharge: HOME | End: 2017-10-06
Attending: INTERNAL MEDICINE | Admitting: INTERNAL MEDICINE
Payer: COMMERCIAL

## 2017-10-05 VITALS — DIASTOLIC BLOOD PRESSURE: 56 MMHG | HEART RATE: 62 BPM | SYSTOLIC BLOOD PRESSURE: 88 MMHG | OXYGEN SATURATION: 96 %

## 2017-10-05 VITALS — DIASTOLIC BLOOD PRESSURE: 46 MMHG | OXYGEN SATURATION: 96 % | SYSTOLIC BLOOD PRESSURE: 80 MMHG | HEART RATE: 61 BPM

## 2017-10-05 VITALS
HEART RATE: 59 BPM | SYSTOLIC BLOOD PRESSURE: 105 MMHG | DIASTOLIC BLOOD PRESSURE: 49 MMHG | OXYGEN SATURATION: 96 % | TEMPERATURE: 98.24 F

## 2017-10-05 VITALS
OXYGEN SATURATION: 90 % | DIASTOLIC BLOOD PRESSURE: 66 MMHG | HEART RATE: 64 BPM | TEMPERATURE: 98.24 F | SYSTOLIC BLOOD PRESSURE: 118 MMHG

## 2017-10-05 VITALS — OXYGEN SATURATION: 97 %

## 2017-10-05 VITALS — HEART RATE: 60 BPM | SYSTOLIC BLOOD PRESSURE: 83 MMHG | DIASTOLIC BLOOD PRESSURE: 53 MMHG | OXYGEN SATURATION: 97 %

## 2017-10-05 VITALS — SYSTOLIC BLOOD PRESSURE: 91 MMHG | DIASTOLIC BLOOD PRESSURE: 59 MMHG | HEART RATE: 66 BPM | OXYGEN SATURATION: 95 %

## 2017-10-05 VITALS
BODY MASS INDEX: 30.15 KG/M2 | HEIGHT: 64 IN | BODY MASS INDEX: 30.15 KG/M2 | WEIGHT: 176.59 LBS | BODY MASS INDEX: 30.15 KG/M2 | WEIGHT: 176.59 LBS | HEIGHT: 64 IN

## 2017-10-05 VITALS
SYSTOLIC BLOOD PRESSURE: 105 MMHG | OXYGEN SATURATION: 94 % | TEMPERATURE: 99.68 F | HEART RATE: 70 BPM | DIASTOLIC BLOOD PRESSURE: 65 MMHG

## 2017-10-05 VITALS — SYSTOLIC BLOOD PRESSURE: 90 MMHG | HEART RATE: 58 BPM | DIASTOLIC BLOOD PRESSURE: 54 MMHG | OXYGEN SATURATION: 95 %

## 2017-10-05 VITALS — SYSTOLIC BLOOD PRESSURE: 105 MMHG | HEART RATE: 62 BPM | DIASTOLIC BLOOD PRESSURE: 49 MMHG | OXYGEN SATURATION: 95 %

## 2017-10-05 VITALS — SYSTOLIC BLOOD PRESSURE: 93 MMHG | OXYGEN SATURATION: 93 % | DIASTOLIC BLOOD PRESSURE: 49 MMHG | HEART RATE: 64 BPM

## 2017-10-05 VITALS
SYSTOLIC BLOOD PRESSURE: 126 MMHG | OXYGEN SATURATION: 97 % | DIASTOLIC BLOOD PRESSURE: 84 MMHG | HEART RATE: 57 BPM | TEMPERATURE: 98.42 F

## 2017-10-05 VITALS — HEART RATE: 66 BPM | DIASTOLIC BLOOD PRESSURE: 57 MMHG | SYSTOLIC BLOOD PRESSURE: 92 MMHG

## 2017-10-05 DIAGNOSIS — Z88.0: ICD-10-CM

## 2017-10-05 DIAGNOSIS — Z88.2: ICD-10-CM

## 2017-10-05 DIAGNOSIS — Z79.02: ICD-10-CM

## 2017-10-05 DIAGNOSIS — Z79.899: ICD-10-CM

## 2017-10-05 DIAGNOSIS — R93.1: ICD-10-CM

## 2017-10-05 DIAGNOSIS — F31.9: ICD-10-CM

## 2017-10-05 DIAGNOSIS — I73.9: ICD-10-CM

## 2017-10-05 DIAGNOSIS — E78.5: ICD-10-CM

## 2017-10-05 DIAGNOSIS — I12.9: ICD-10-CM

## 2017-10-05 DIAGNOSIS — Z88.1: ICD-10-CM

## 2017-10-05 DIAGNOSIS — I25.10: Primary | ICD-10-CM

## 2017-10-05 DIAGNOSIS — N18.9: ICD-10-CM

## 2017-10-05 DIAGNOSIS — Q07.00: ICD-10-CM

## 2017-10-05 RX ADMIN — METOPROLOL TARTRATE SCH MG: 50 TABLET, FILM COATED ORAL at 20:34

## 2017-10-05 RX ADMIN — TOPIRAMATE SCH MG: 25 TABLET, FILM COATED ORAL at 20:29

## 2017-10-05 RX ADMIN — VERAPAMIL HYDROCHLORIDE SCH MG: 120 TABLET, FILM COATED, EXTENDED RELEASE ORAL at 20:28

## 2017-10-05 NOTE — CARDIAC CATHETERIZATION
Procedure Note


Procedure Date


Oct 5, 2017.





Pre-Procedure Diagnosis


Positive Stress Test





AUC Score


9





Post-Procedure Diagnosis


Severe CAD, Normal Intracardiac Pressures





Procedure(s) Performed


Coronary Angiography, Left Heart Cath





Cardiologist


Dr. Peterson





Assistant(s)


Glunt





Estimated Blood Loss


< 20 ml





Medication(s)


Fentanyl, Heparin, Nicardipine, Versed, Lidocaine 1%





Summary of Findings


Coronary angiography:


1. Fluoroscopy demonstrated calcification within the RCA, proximal LAD, 

proximal circumflex, and aortic arch.


2.  Left main coronary artery:  The LMCA was without significant CAD 

angiographically.


3. Left anterior descending:  The LAD extends to the apex.  Proximal LAD 

approximately 50% stenosis.  Large septal  approximately 60-70% 

ostial stenosis.  Remainder of LAD without significant CAD.  Large first 

diagonal vessel with lateral branch.  No significant CAD within the diagonal 

vessel.


4. Circumflex:  The circumflex is a large caliber vessel and tortuous.  Mid 

circumflex 90%, JOSE GUADALUPE 3 flow.  Large caliber OM1 without significant CAD.  The


5. Right coronary artery:  The RCA is large and dominant.  Proximal RCA 90%.  

Mid RCA subtotal occlusion (99%).  The PDA and posterior lateral branch filled 

via left-to-right collaterals.


6. Ramus intermedius:  Small caliber ramus intermedius with ostial/proximal 50-

60% stenosis.





Left heart catheterization:


1. Left ventriculography was not performed to reduce contrast exposure given 

chronic kidney disease.


2.  No significant aortic stenosis.


3. Slightly elevated LVEDP; 13mmHg, in the setting of IV hydration prior to the 

procedure.





Sedation start time:  9:45 a.m.


Sedation end time:  10:13 a.m.





Procedural notes:


1. Procedures performed via the right radial artery without known complication.

  JL 3.5 and JR4 5 Chinese diagnostic catheters used to selectively engage the 

LMCA and RCA, respectively.





Impression:


1. Severe CAD involving the mid circumflex and RCA with subtotal mid RCA and 

left-to-right collaterals.


2.  Moderate CAD involving the proximal LAD, ramus intermedius, and moderate to 

severe CAD involving the ostial septal .


3. No aortic stenosis.


4. No significant elevation in LVEDP (slightly elevated pressure in the setting 

of iatrogenic IV hydration).





Plan:


1. Images were reviewed with Dr. Yung of interventional Cardiology.  Plan is 

to perform FFR of LAD.  If LAD proximal stenosis is non severe, consideration 

for circumflex PCI.  If LAD stenosis is significant, consideration for CT 

surgical evaluation.





Hemodynamics


Rest Ao:


161/46 mmHg


Final Ao:


188/48


LV:


183/4/13





Recommendations


management recommendations (FFR as above with possible Cx PCI)





Specimens


None





Radiation Exposure (mGy)


664 mGy. Fluoro time 2.2 min.





Contrast (mls)


45 ml





Procedural Complication(s)


None





Disposition


Cath Lab Holding/Recovery





ACC Data


Cardiac Status


Clinical evaluation leading to the procedure


CAD Presntation:  Positive Stress Test


Anginal Classification:  CCS III


Heart Failure:  No


Cardiogenic Shock w/in 24Hrs:  No


Cardiac Arrest w/in 24Hrs:  No


Imaging studies past 6 months:  No


Stress studies past 6 months:  Yes


Standard Exercise Stress Test:  Yes - Positive, Risk/Extent of Ischemia (High)


Stress Echocardiogram:  Yes - Positive, Risk/Extent of Ischemia (High)


Stress Testing w/SPECT MPI:  No


Cardiac CTA:  No





Coronary Anatomy


Dominant:  Right


Left Main (% Stenosis):  Normal


LAD (% Stenosis):  Proximal (50)


D1 (% Stenosis):  Normal


Circumflex (% Stenosis):  Mid (90%)


OM1 (% Stenosis):  Normal


RCA (% Stenosis):  Proximal (90%), Mid (99%)


R PDA (% Stenosis):  Normal


R PL1 (% Stenosis):  Normal


Ramus (% Stenosis):  Ostial (50%)





Left Ventricular Angiography


EF (%):  n/a





Diagnostic


Physician's Name:  Ramón Peterson MD


Status:  Elective





Closure Device


Percutaneous Entry Location:  Radial


Closure Device:  Radial Band (after completion of FFR or PCI. radial sheath was 

left in place for Dr. Yung.)


Recommendations:  management recommendations (FFR with possible PCI)

## 2017-10-05 NOTE — PROCEDURE NOTE
Post-Mod Sedation Assessment


General


Date of Moderate Sedation


Oct 5, 2017.


Vital Signs:





Vital Signs Past 12 Hours








  Date Time  Temp Pulse Resp B/P (MAP) Pulse Ox O2 Delivery O2 Flow Rate FiO2


 


10/5/17 11:26  62 16 98/55 (69) 99 Mask 6 


 


10/5/17 07:00 36.8 59 16 105/50 96 Room Air  





    174/49    











Review - Discharge Criteria


Vital Signs Stable:  Yes


Alert/Oriented/Conversant:  Yes


Returned to Baseline Mental St:  Yes


Nausea Absent/Minimal:  Yes


Pain/Discomfort/Absent/Minimal:  Yes


Normal/Baseline Respirations:  Yes


Active Bleeding?:  No


Pt Received D/C Instructions:  N/A


Prescriptions Given:  None





Specific Proced. D/C Criteria


Distal Pulses Present (Cardiac:  Yes


Groin site assessed-Card Cath:  N/A


Voided Prior To Discharge:  N/A





Discharged Patients


Adult Escort/Transportation:  Yes

## 2017-10-05 NOTE — HISTORY & PHYSICAL EXAMINATION
DATE OF ADMISSION:  10/05/2017

 

TIME:  09:28 a.m.

 

CHIEF COMPLAINT:  Here for cardiac catheterization.

 

HISTORY OF PRESENT ILLNESS:  Ms. Palma is a 62-year-old female with a history

significant for hypertension, dyslipidemia, peripheral arterial disease, one

functional kidney, impaired fasting glucose, bipolar disorder, complex

migraines and PFO.  She was referred for cardiac catheterization following a

stress echo, which was ordered for atypical chest pain.

 

This stress echo was done on 09/18/2017 and it was abnormal at 64% maximum

predicted heart rate.  She had poor exercise tolerance and also described

chest pain in recovery with a markedly abnormal exercise ECG.  She did have a

hypertensive response to exercise with a peak blood pressure of 220/80 mmHg.

 

Her chest pain was described as left-sided chest discomfort that does not

radiate and was not associated with shortness of breath.  It was substernal

and described as a pressure sensation lasting approximately 5 minutes. 

Typically, symptoms would occur during the violent migraines.  She has

dyspnea with exertion as well, but not related to her chest pain.  There is

no reported bleeding such as melena, hematochezia, or hematuria.

 

PAST MEDICAL HISTORY:

1.  Abnormal stress echo as noted above.

2.  Hypertension.

3.  Dyslipidemia.

4.  Impaired fasting glucose.

5.  Bipolar disorder.

6.  Complex migraines.

7.  Arnold-Chiari alternation.

8.  Patent foramen ovale.

9.  Renal artery stenosis with one functional kidney.

10.  Left subclavian artery stenosis.

11.  Carotid artery stenosis.

12.  Osteopenia.

13.  Hyponatremia.

14.  Gastroparesis.

15.  Chronic kidney disease.

16.  Superior mesenteric artery stenosis.

17.  Vertigo.

18.  Vitamin D deficiency.

 

HOME MEDICATIONS:  Include Plavix 75 mg daily, atorvastatin 40 mg daily,

Bystolic 20 mg daily, Lasix 20 mg daily, hydralazine 100 mg q. 6 hours,

lisinopril 40 mg daily, and verapamil 120 mg twice daily.

 

ALLERGIES:  INCLUDE CARVEDILOL, CHLORTHALIDONE, CODEINE DERIVATIVES, EFFEXOR,

FLAGYL, HCTZ, LITHIUM, PENICILLIN, SULFA DRUGS, TEGRETOL, TETRACYCLINE,

WELLBUTRIN, ZOLOFT, AND CHOCOLATE.

 

SOCIAL HISTORY:  Smokes occasionally.  Rare alcohol.  No drugs.  Lives at

home with her .  Her  and son are present today.

 

FAMILY HISTORY:  Mother with heart disease.  Father had valve replacement in

the 70s.  No known premature CAD.

 

PHYSICAL EXAMINATION:

VITAL SIGNS:  Temperature 36.8 degrees, heart rate 59 beats per minute,

respiratory rate 16, blood pressure in the left arm 105/50 mmHg, blood

pressure in the right arm 174/49 mmHg, and oxygen saturation 96% on room air.

GENERAL:  No acute distress.  She is alert.

NECK:  No JVD.

CARDIAC EXAM:  No ventricular heave.  Regular, normal S1 and S2.  2/6 early

peaking systolic ejection murmur best heard at the right upper sternal

border.  No rubs or gallops.

LUNGS:  Decreased breath sounds throughout, but otherwise clear.

ABDOMEN:  Soft, nontender, and nondistended.

EXTREMITIES:  1-2+ right radial pulse.  2+ femoral pulses bilaterally with

femoral bruits.  No cyanosis or edema.

PSYCHIATRIC:  Affect appears appropriate.

 

LABORATORY DATA:  From September 2017, white blood cell count 7.05,

hemoglobin 14.1, and platelets 276.  Sodium 140, potassium 3.6, BUN 13, and

creatinine 1.5.  INR 0.9.

 

Chest x-ray on 07/10/2017.  No consolidation.  Prominent hilar/central

pulmonary arteries.

 

Stress echo report as above.

 

ECG on 07/11/2017, sinus bradycardia at 57 beats per minute.  Nonspecific

ST-T wave abnormality.

 

ASSESSMENT AND PLAN:

1.  Abnormal stress echo and stress ECG:  Given her symptoms of chest pain,

however, is somewhat atypical, and her multiple risk factors with a

significantly abnormal stress echo, cardiac catheterization was recommended

as an outpatient.  Risks and benefits have been discussed with her in detail.

 She was made aware that CT surgery is not available at this facility.  The

patient has given informed written consent to undergo the procedure at [facility] for diagnostic coronary angiography and PCI, if deemed

appropriate.  She has been brought in early for IV hydration for chronic

kidney disease.

2.  Disposition:  Cardiac catheterization will be performed and further

disposition upon findings.

## 2017-10-05 NOTE — CARDIAC CATHETERIZATION
Procedure Note


Procedure Date


Oct 5, 2017.





Pre-Procedure Diagnosis


Positive Stress Test





AUC Score


7





Post-Procedure Diagnosis


Severe CAD, Successful PCI





Procedure(s) Performed


Drug Eluting Stent





Cardiologist


Dilshad





Assistant(s)


Glunt





Estimated Blood Loss


25





Medication(s)


Fentanyl, Heparin, Nicardipine, Nitroglycerin, Versed





Summary of Findings


Indication: Positive stress





Access: 6Fr Right radial artery


Catheters: EBU 3.5 guide





Findings:





For full details of patients coronary angiography please see cath report 

dictated by Dr. Peterson.  





Briefly patient found to have intermediate proximal LAD disease, severe focal 

mid circumflex disease and subtotally occluded mid RCA with left to right 

collaterals. 





-- FFR --


Proximal LAD -- 0.84





-- PCI --


Antithrombotic therapy: Heparin, Clopidogrel


Procedure:


LM cannulated with EBU 3.5 guide


Attempted first with whisper wire and eventually passed mid circumflex lesion 

with Choice PT wire 


Mid LCx lesion predilated with 2.5 compliant balloon


Dilated lesion stented with 2.5 x 14 Hank Resolute HEIDI


Stent post-dilated with 2.5 noncompliant balloon


IC vasodilators administered for spasm


Post procedure JOSE GUADALUPE 3 flow, stent well expanded with minimal residual stenosis 

and no apparent cardiac complications.  





Arterial Closure: TR Band





Summary:


1. Intermediate proximal LAD.  Non-obstructive by FFR (0.84)





2. Successful PCI of mid circumflex with single HEIDI (2.5 x 15 Hank HEIDI)





Recommendations:


To PCU for continued monitoring


Reloaded with 300mg Clopidogrel


Continue dual-antiplatelet therapy for at least a year


Continue statin, ASCVD risk factor modification per Dr. Peterson.


Consult cardiac Rehab





Hemodynamics


Rest Ao:


161/46/84


Final Ao:


158/45/88


LV:


183/13





Recommendations


PCI without planned CABG





Specimens


None





Radiation Exposure (mGy)


3133





Contrast (mls)


135 Visi





Fluids (cc crystalloids)


400





Drains


None





Anesthesia


Moderate





Procedural Complication(s)


None





Disposition


PCU





ACC Data


Cardiac Status


Clinical evaluation leading to the procedure


CAD Presntation:  Positive Stress Test


Anginal Classification:  CCS III





Closure Device


Percutaneous Entry Location:  Femoral


Closure Device:  Radial Band


Recommendations:  PCI without planned CABG


PCI Indication:  + Stress Test





Lesion


Segment Name:  mid circumflex


Culprit Artery:  No


Stenosis Prior to Rx (%):  90


Chronic Total Occlusion:  No


IVUS:  No


FFR:  No


Pre-Procedure JOSE GUADALUPE Flow:  3


Previously Treated Lesion:  No


Lesion Complexity:  Non-High/Non-C


Lesion Length (mm):  10


Thrombus Present:  No


Bifurcation Lesion:  Yes


Guidewire Across Lesion:  Yes


Guidewire:  


   Stenosis Post-Procedure (%):  0


   Post-Procedure JOSE GUADALUPE Flow:  3


   Device(s) Deployed:  Yes





Intraprocedure Events


Significant Dissection:  No


Perforation:  No

## 2017-10-05 NOTE — PROCEDURE NOTE
Pre-Mod Sedation Assessment


General


Date of Moderate Sedation:


Oct 5, 2017.


Vital Signs:





Vital Signs Past 12 Hours








  Date Time  Temp Pulse Resp B/P (MAP) Pulse Ox O2 Delivery O2 Flow Rate FiO2


 


10/5/17 07:00 36.8 59 16 105/50 96 Room Air  





    174/49    











Review


Cardiovascular:  regular rate, rhythm, + systolic murmur


Abdomen:  non tender, soft


Lungs:  lungs clear





Pre-Sedation Airway Assessment


Oral Cavity:  Dentures


Short Thick Neck:  No


Hx of Sleep Apnea:  No


Smoking Status:  Current Some Day Smoker





Procedure Planning


Contraindications-for Mod Sed:  None


Yes





Notes








The planned sedation has been discussed with the patient and consent obtained.  

I have


identified the patient, determined the appropriateness of sedation and have 

assessed the


patient immediately prior to the procedure.  





All medicine(s) and interventions are by my order.

## 2017-10-06 VITALS
OXYGEN SATURATION: 95 % | SYSTOLIC BLOOD PRESSURE: 97 MMHG | HEART RATE: 61 BPM | TEMPERATURE: 98.6 F | DIASTOLIC BLOOD PRESSURE: 61 MMHG

## 2017-10-06 VITALS
TEMPERATURE: 97.88 F | HEART RATE: 59 BPM | DIASTOLIC BLOOD PRESSURE: 70 MMHG | OXYGEN SATURATION: 95 % | SYSTOLIC BLOOD PRESSURE: 130 MMHG

## 2017-10-06 VITALS
DIASTOLIC BLOOD PRESSURE: 70 MMHG | SYSTOLIC BLOOD PRESSURE: 130 MMHG | TEMPERATURE: 97.88 F | HEART RATE: 59 BPM | OXYGEN SATURATION: 95 %

## 2017-10-06 LAB
ANION GAP SERPL CALC-SCNC: 9 MMOL/L (ref 3–11)
BASOPHILS # BLD: 0.05 K/UL (ref 0–0.2)
BASOPHILS NFR BLD: 0.7 %
BUN SERPL-MCNC: 13 MG/DL (ref 7–18)
BUN/CREAT SERPL: 8.9 (ref 10–20)
CALCIUM SERPL-MCNC: 8.3 MG/DL (ref 8.5–10.1)
CHLORIDE SERPL-SCNC: 113 MMOL/L (ref 98–107)
CO2 SERPL-SCNC: 21 MMOL/L (ref 21–32)
COMPLETE: YES
CREAT CL PREDICTED SERPL C-G-VRATE: 42.7 ML/MIN
CREAT SERPL-MCNC: 1.4 MG/DL (ref 0.6–1.2)
EOSINOPHIL NFR BLD AUTO: 212 K/UL (ref 130–400)
GLUCOSE SERPL-MCNC: 99 MG/DL (ref 70–99)
HCT VFR BLD CALC: 39.6 % (ref 37–47)
IG%: 0.4 %
IMM GRANULOCYTES NFR BLD AUTO: 22.9 %
LYMPHOCYTES # BLD: 1.66 K/UL (ref 1.2–3.4)
MCH RBC QN AUTO: 30.5 PG (ref 25–34)
MCHC RBC AUTO-ENTMCNC: 32.3 G/DL (ref 32–36)
MCV RBC AUTO: 94.3 FL (ref 80–100)
MONOCYTES NFR BLD: 11.2 %
NEUTROPHILS # BLD AUTO: 2.1 %
NEUTROPHILS NFR BLD AUTO: 62.7 %
PMV BLD AUTO: 10.1 FL (ref 7.4–10.4)
POTASSIUM SERPL-SCNC: 4.3 MMOL/L (ref 3.5–5.1)
RBC # BLD AUTO: 4.2 M/UL (ref 4.2–5.4)
SODIUM SERPL-SCNC: 144 MMOL/L (ref 136–145)
WBC # BLD AUTO: 7.24 K/UL (ref 4.8–10.8)

## 2017-10-06 RX ADMIN — METOPROLOL TARTRATE SCH MG: 50 TABLET, FILM COATED ORAL at 10:01

## 2017-10-06 RX ADMIN — VERAPAMIL HYDROCHLORIDE SCH MG: 120 TABLET, FILM COATED, EXTENDED RELEASE ORAL at 08:22

## 2017-10-06 RX ADMIN — TOPIRAMATE SCH MG: 25 TABLET, FILM COATED ORAL at 08:22

## 2017-10-06 NOTE — DISCHARGE INSTRUCTIONS
Discharge Instructions


Procedure


Procedure Date:


Oct 6, 2017.


Reason for Visit:


Positive Stress Test,*Nicholas To Do*.





Discharge


Discharge Date:


Oct 6, 2017.


Discharge Diagnosis:


CAD post stent


Last Recorded Wt (Kilograms):  80.100





Anesthesia


Post Anesthesia Instructions:


If you have had General Anesthesia or IV Sedation:





*  Do not drive today.





*  Resume driving when surgeon permits.





*  Do not make important decisions or sign legal documents today.





*  Call surgeon for:


   1.  Temperature elevations greater than 101 degrees F.


   2.  Uncontrollable pain.


   3.  Excessive bleeding.


   4.  Persistent nausea and vomiting.


   5.  Medication intolerance (nausea, vomiting or rash).





*  For nausea and vomiting use only clear liquids such as: tea, soda, bouillon 

until


   nausea subsides, then gradually increase diet as tolerated.





*  If you have any concerns or questions, call your surgeon's office.  If 

physician is 


   unavailable and it is an emergency, call 911 or go to the nearest emergency 

room.





Instructions


Activity Recommendations:  limitations as noted below


Recommended Home Diet:  resume previous diet, low cholesterol


Allergies:  


Coded Allergies:  


     Bupropion (Verified  Allergy, Unknown, UNKNOWN, 11/4/16)


     Carbamazepine (Unverified  Allergy, Unknown, UNKNOWN, 11/4/16)


     Codeine (Verified  Allergy, Unknown, "TIGHTENS MUSCLES UP" PER PT, 11/4/16)


     Penicillins (Verified  Allergy, Unknown, AMPICILLIN, 11/4/16)


     Sertraline (Unverified  Allergy, Unknown, UNKNOWN, 11/4/16)


     Sulfa Antibiotics (Unverified  Allergy, Unknown, HIVES, 11/7/16)


     Tetracycline (Verified  Allergy, Unknown, 11/4/16)


     Venlafaxine (Unverified  Allergy, Unknown, UNKNOWN, 11/4/16)


     Pork (Verified  Adverse Reaction, Intermediate, VOMIT, 11/4/16)


     Chocolate (Verified  Adverse Reaction, Mild, MIGRAINES, 11/4/16)


     Lithium (Verified  Adverse Reaction, Unknown, VIOLENT EMESIS, 11/4/16)





Follow Up


Additional Instructions:


ACTIVITY RECOMMENDATIONS:





It is common to feel weak and fatigue for a few days.





*  Do not drive or operate any motorized equipment for the next


    three days.





*  Limit stair usage (2 or 3 trips a day only) for the next three days.





*  Do not lift anything heavier than 10 pounds for the next three


    days.





*  Do not engage in vigorous exercise or any sports for the next


    five days.





*  You may shower the day after your procedure, but do not 


    immerse the area for three days.  Cleanse the site gently with


    soap and water.








SPECIAL CARE INSTRUCTIONS:





* You may replace the pressure dressing or band-aid the morning 


after the procedure.





* After your procedure, it is normal to have a small bruise or small lump


at the site.  Examine your site daily for any change in the bruise or lump,


redness, swelling, drainage or numbness.  


Notify your doctor if any change.





BLEEDING:





* If there is a small amount of bleeding at the site, lie down and apply


firm pressure with a clean cloth for ten minutes.  When the bleeding stops,


lie quietly keeping the procedure limb straight for six hours.  


Notify your doctor as soon as possible.





* If the bleeding does not stop after ten minutes or if there is a large


amount of bleeding or spurting, call 911 immediately.  Continue to lie 


down and hold firm pressure until help arrives.





SKIN IRRITATION:





*  You may experience some redness and/or swelling in the area where radiation 

was


administered.  If any skin irritation occurs, please contact your family 

physician.








FOLLOW UP VISIT:





Keep any scheduled doctor appointments.


Follow-up with:


Follow-up with Dr. Peterson in 2-3 weeks.





Shannan Walker Recommendations:


 


Call your doctor if:





*  Temperature above 101 degrees


*  Pain not relieved by pain medicine ordered


*  There is increased drainage or redness from any incision


*  You have any unanswered questions or concerns.





Your Doctors Instructions noted above were prepared by provider Nabeel Yung.


Patient Signature Section:





 Patient Instructions Signature Page














Nayelidelon Palma 











Patient (or Guardian) Signature/Date:____________________________________ I 

have read and understand the instructions given to me by my caregivers.








Caregiver/RN/Doctor Signature/Date:____________________________________











The above-named patient and/or guardian has received patient instructions on 

this date.





























+  Original Patient Signature Page (only) stays with chart.  Please make copy 

for patient.

## 2017-10-07 ENCOUNTER — HOSPITAL ENCOUNTER (OUTPATIENT)
Dept: HOSPITAL 45 - C.LAB | Age: 62
Discharge: HOME | End: 2017-10-07
Attending: INTERNAL MEDICINE
Payer: COMMERCIAL

## 2017-10-07 DIAGNOSIS — N28.9: Primary | ICD-10-CM

## 2017-10-07 LAB
ANION GAP SERPL CALC-SCNC: 7 MMOL/L (ref 3–11)
BUN SERPL-MCNC: 11 MG/DL (ref 7–18)
BUN/CREAT SERPL: 7.7 (ref 10–20)
CALCIUM SERPL-MCNC: 9 MG/DL (ref 8.5–10.1)
CHLORIDE SERPL-SCNC: 113 MMOL/L (ref 98–107)
CO2 SERPL-SCNC: 21 MMOL/L (ref 21–32)
CREAT SERPL-MCNC: 1.4 MG/DL (ref 0.6–1.2)
GLUCOSE SERPL-MCNC: 90 MG/DL (ref 70–99)
POTASSIUM SERPL-SCNC: 4.1 MMOL/L (ref 3.5–5.1)
SODIUM SERPL-SCNC: 141 MMOL/L (ref 136–145)

## 2017-10-21 ENCOUNTER — HOSPITAL ENCOUNTER (OUTPATIENT)
Dept: HOSPITAL 45 - C.LAB | Age: 62
Discharge: HOME | End: 2017-10-21
Attending: INTERNAL MEDICINE
Payer: COMMERCIAL

## 2017-10-21 DIAGNOSIS — R73.9: ICD-10-CM

## 2017-10-21 DIAGNOSIS — E78.5: Primary | ICD-10-CM

## 2017-10-21 DIAGNOSIS — I10: ICD-10-CM

## 2017-10-21 LAB
ALBUMIN/GLOB SERPL: 1.1 {RATIO} (ref 0.9–2)
ALP SERPL-CCNC: 83 U/L (ref 45–117)
ALT SERPL-CCNC: 14 U/L (ref 12–78)
ANION GAP SERPL CALC-SCNC: 5 MMOL/L (ref 3–11)
AST SERPL-CCNC: 9 U/L (ref 15–37)
BASOPHILS # BLD: 0.06 K/UL (ref 0–0.2)
BASOPHILS NFR BLD: 0.8 %
BUN SERPL-MCNC: 23 MG/DL (ref 7–18)
BUN/CREAT SERPL: 14.7 (ref 10–20)
CALCIUM SERPL-MCNC: 8.9 MG/DL (ref 8.5–10.1)
CHLORIDE SERPL-SCNC: 109 MMOL/L (ref 98–107)
CHOLEST/HDLC SERPL: 3.2 {RATIO}
CO2 SERPL-SCNC: 24 MMOL/L (ref 21–32)
COMPLETE: YES
CREAT SERPL-MCNC: 1.54 MG/DL (ref 0.6–1.2)
EOSINOPHIL NFR BLD AUTO: 301 K/UL (ref 130–400)
EST. AVERAGE GLUCOSE BLD GHB EST-MCNC: 88 MG/DL
GLOBULIN SER-MCNC: 3.4 GM/DL (ref 2.5–4)
GLUCOSE SERPL-MCNC: 78 MG/DL (ref 70–99)
GLUCOSE UR QL: 43 MG/DL
HCT VFR BLD CALC: 44.7 % (ref 37–47)
IG%: 0.4 %
IMM GRANULOCYTES NFR BLD AUTO: 29.7 %
KETONES UR QL STRIP: 61 MG/DL
LYMPHOCYTES # BLD: 2.18 K/UL (ref 1.2–3.4)
MCH RBC QN AUTO: 29.9 PG (ref 25–34)
MCHC RBC AUTO-ENTMCNC: 32.2 G/DL (ref 32–36)
MCV RBC AUTO: 92.7 FL (ref 80–100)
MONOCYTES NFR BLD: 9.4 %
NEUTROPHILS # BLD AUTO: 2.5 %
NEUTROPHILS NFR BLD AUTO: 57.2 %
NITRITE UR QL STRIP: 176 MG/DL (ref 0–150)
PH UR: 139 MG/DL (ref 0–200)
PMV BLD AUTO: 10.6 FL (ref 7.4–10.4)
POTASSIUM SERPL-SCNC: 4 MMOL/L (ref 3.5–5.1)
RBC # BLD AUTO: 4.82 M/UL (ref 4.2–5.4)
SODIUM SERPL-SCNC: 138 MMOL/L (ref 136–145)
TSH SERPL-ACNC: 2.25 UIU/ML (ref 0.3–4.5)
VERY LOW DENSITY LIPOPROT CALC: 35 MG/DL
WBC # BLD AUTO: 7.34 K/UL (ref 4.8–10.8)

## 2017-12-16 ENCOUNTER — HOSPITAL ENCOUNTER (OUTPATIENT)
Dept: HOSPITAL 45 - C.LAB | Age: 62
Discharge: HOME | End: 2017-12-16
Attending: PSYCHIATRY & NEUROLOGY
Payer: COMMERCIAL

## 2017-12-16 DIAGNOSIS — N18.3: ICD-10-CM

## 2017-12-16 DIAGNOSIS — Z00.00: Primary | ICD-10-CM

## 2017-12-16 LAB
BUN SERPL-MCNC: 16 MG/DL (ref 7–18)
CREAT SERPL-MCNC: 1.46 MG/DL (ref 0.6–1.2)

## 2017-12-19 ENCOUNTER — HOSPITAL ENCOUNTER (OUTPATIENT)
Dept: HOSPITAL 45 - C.MRI | Age: 62
Discharge: HOME | End: 2017-12-19
Attending: PSYCHIATRY & NEUROLOGY
Payer: COMMERCIAL

## 2017-12-19 DIAGNOSIS — R53.1: ICD-10-CM

## 2017-12-19 DIAGNOSIS — I67.9: Primary | ICD-10-CM

## 2017-12-19 DIAGNOSIS — F09: ICD-10-CM

## 2017-12-19 NOTE — DIAGNOSTIC IMAGING REPORT
MRI OF THE BRAIN WITHOUT AND WITH IV CONTRAST



CLINICAL HISTORY: I67.9 Cerebrovascular [id number] Cognitive [id number] right

arm weakness    



COMPARISON STUDY:  Head CT dated 7/10/2017, MRI the brain dated 8/7/2015 



TECHNIQUE: MRI of the brain was performed from the vertex to the skull base

utilizing various T1 and T2 weighted sequences. Following the IV administration

of 7.5 mL of Gadavist contrast, additional enhanced images were obtained.



FINDINGS: 

Sagittal T1, axial diffusion, proton density and T2 weighted axial, coronal

FLAIR, and pre and post axial T1-weighted images were acquired. These were

supplemented with post gadolinium coronal T1 weighted images. 

No intra or extra-axial mass lesions are visualized. There is 3 mm of cerebellar

tonsillar ectopia.

Axial diffusion-weighted images reveal no evidence of acute or subacute

infarction.

There is no evidence of ventricular dilatation.

Proton density T2-weighted and FLAIR images reveal slight progression in the

scattered foci of increased T2 signal within the white matter, likely on a small

vessel basis. There are also foci of increased T2 and FLAIR signal within the

sonia.

There are no abnormal flow voids.

There is no evidence of pathologic enhancement.





IMPRESSION:  

1. No acute intracranial findings

2. No evidence of intracranial mass

3. No evidence of acute or subacute infarction

4. Slight progression in the scattered foci of increased T2 signal within the

white matter, likely on a small vessel basis







Electronically signed by:  Chintan Luis M.D.

12/19/2017 7:40 AM



Dictated Date/Time:  12/19/2017 7:36 AM

## 2018-05-03 ENCOUNTER — HOSPITAL ENCOUNTER (OUTPATIENT)
Dept: HOSPITAL 45 - C.LAB | Age: 63
Discharge: HOME | End: 2018-05-03
Attending: INTERNAL MEDICINE
Payer: COMMERCIAL

## 2018-05-03 DIAGNOSIS — E78.5: Primary | ICD-10-CM

## 2018-05-03 DIAGNOSIS — E55.9: ICD-10-CM

## 2018-05-03 DIAGNOSIS — N18.3: ICD-10-CM

## 2018-05-03 DIAGNOSIS — I12.9: ICD-10-CM

## 2018-05-03 DIAGNOSIS — R73.03: ICD-10-CM

## 2018-05-03 DIAGNOSIS — E87.1: ICD-10-CM

## 2018-05-03 LAB
ALBUMIN SERPL-MCNC: 3.5 GM/DL (ref 3.4–5)
ALT SERPL-CCNC: 16 U/L (ref 12–78)
AST SERPL-CCNC: 12 U/L (ref 15–37)
BUN SERPL-MCNC: 19 MG/DL (ref 7–18)
CALCIUM SERPL-MCNC: 8.8 MG/DL (ref 8.5–10.1)
CO2 SERPL-SCNC: 25 MMOL/L (ref 21–32)
CREAT SERPL-MCNC: 1.36 MG/DL (ref 0.6–1.2)
GLUCOSE SERPL-MCNC: 78 MG/DL (ref 70–99)
HBA1C MFR BLD: 4.9 % (ref 4.5–5.6)
KETONES UR QL STRIP: 57 MG/DL
PH UR: 128 MG/DL (ref 0–200)
PHOSPHATE SERPL-MCNC: 3.6 MG/DL (ref 2.5–4.9)
POTASSIUM SERPL-SCNC: 3.7 MMOL/L (ref 3.5–5.1)
SODIUM SERPL-SCNC: 136 MMOL/L (ref 136–145)